# Patient Record
Sex: MALE | URBAN - METROPOLITAN AREA
[De-identification: names, ages, dates, MRNs, and addresses within clinical notes are randomized per-mention and may not be internally consistent; named-entity substitution may affect disease eponyms.]

---

## 2022-12-09 ENCOUNTER — APPOINTMENT (OUTPATIENT)
Dept: CT IMAGING | Age: 82
End: 2022-12-09
Payer: MEDICARE

## 2022-12-09 ENCOUNTER — HOSPITAL ENCOUNTER (EMERGENCY)
Age: 82
Discharge: HOME | End: 2022-12-09
Payer: MEDICARE

## 2022-12-09 VITALS
WEIGHT: 167 LBS | HEART RATE: 75 BPM | OXYGEN SATURATION: 98 % | BODY MASS INDEX: 22.62 KG/M2 | HEIGHT: 72 IN | RESPIRATION RATE: 18 BRPM | TEMPERATURE: 98.6 F | DIASTOLIC BLOOD PRESSURE: 78 MMHG | SYSTOLIC BLOOD PRESSURE: 145 MMHG

## 2022-12-09 DIAGNOSIS — S02.2XXA CLOSED FRACTURE OF NASAL BONE, INITIAL ENCOUNTER: ICD-10-CM

## 2022-12-09 DIAGNOSIS — S00.03XA HEMATOMA OF FRONTAL SCALP, INITIAL ENCOUNTER: Primary | ICD-10-CM

## 2022-12-09 LAB
ABO/RH: NORMAL
ALBUMIN SERPL-MCNC: 3.8 G/DL (ref 3.5–4.6)
ALP BLD-CCNC: 88 U/L (ref 35–104)
ALT SERPL-CCNC: 11 U/L (ref 0–41)
ANION GAP SERPL CALCULATED.3IONS-SCNC: 12 MEQ/L (ref 9–15)
ANTIBODY SCREEN: NORMAL
APTT: 28.7 SEC (ref 24.4–36.8)
AST SERPL-CCNC: 24 U/L (ref 0–40)
BASOPHILS ABSOLUTE: 0 K/UL (ref 0–0.2)
BASOPHILS RELATIVE PERCENT: 0.5 %
BILIRUB SERPL-MCNC: 0.5 MG/DL (ref 0.2–0.7)
BUN BLDV-MCNC: 34 MG/DL (ref 8–23)
CALCIUM SERPL-MCNC: 8.6 MG/DL (ref 8.5–9.9)
CHLORIDE BLD-SCNC: 102 MEQ/L (ref 95–107)
CO2: 21 MEQ/L (ref 20–31)
CREAT SERPL-MCNC: 1.02 MG/DL (ref 0.7–1.2)
EOSINOPHILS ABSOLUTE: 0.1 K/UL (ref 0–0.7)
EOSINOPHILS RELATIVE PERCENT: 1.8 %
GFR SERPL CREATININE-BSD FRML MDRD: >60 ML/MIN/{1.73_M2}
GLOBULIN: 2.8 G/DL (ref 2.3–3.5)
GLUCOSE BLD-MCNC: 85 MG/DL (ref 70–99)
HCT VFR BLD CALC: 43.8 % (ref 42–52)
HEMOGLOBIN: 14.6 G/DL (ref 14–18)
INR BLD: 1
LYMPHOCYTES ABSOLUTE: 1.4 K/UL (ref 1–4.8)
LYMPHOCYTES RELATIVE PERCENT: 20.5 %
MCH RBC QN AUTO: 34.3 PG (ref 27–31.3)
MCHC RBC AUTO-ENTMCNC: 33.3 % (ref 33–37)
MCV RBC AUTO: 102.9 FL (ref 79–92.2)
MONOCYTES ABSOLUTE: 0.5 K/UL (ref 0.2–0.8)
MONOCYTES RELATIVE PERCENT: 6.7 %
NEUTROPHILS ABSOLUTE: 4.9 K/UL (ref 1.4–6.5)
NEUTROPHILS RELATIVE PERCENT: 70.5 %
PDW BLD-RTO: 13.4 % (ref 11.5–14.5)
PLATELET # BLD: 181 K/UL (ref 130–400)
POTASSIUM SERPL-SCNC: 3.9 MEQ/L (ref 3.4–4.9)
PROTHROMBIN TIME: 13.3 SEC (ref 12.3–14.9)
RBC # BLD: 4.26 M/UL (ref 4.7–6.1)
SODIUM BLD-SCNC: 135 MEQ/L (ref 135–144)
TOTAL PROTEIN: 6.6 G/DL (ref 6.3–8)
WBC # BLD: 6.9 K/UL (ref 4.8–10.8)

## 2022-12-09 PROCEDURE — 85025 COMPLETE CBC W/AUTO DIFF WBC: CPT

## 2022-12-09 PROCEDURE — 86900 BLOOD TYPING SEROLOGIC ABO: CPT

## 2022-12-09 PROCEDURE — 70486 CT MAXILLOFACIAL W/O DYE: CPT

## 2022-12-09 PROCEDURE — 99285 EMERGENCY DEPT VISIT HI MDM: CPT

## 2022-12-09 PROCEDURE — 6830039000 HC L3 TRAUMA ALERT

## 2022-12-09 PROCEDURE — 36415 COLL VENOUS BLD VENIPUNCTURE: CPT

## 2022-12-09 PROCEDURE — 72125 CT NECK SPINE W/O DYE: CPT

## 2022-12-09 PROCEDURE — 80053 COMPREHEN METABOLIC PANEL: CPT

## 2022-12-09 PROCEDURE — 70450 CT HEAD/BRAIN W/O DYE: CPT

## 2022-12-09 PROCEDURE — 85730 THROMBOPLASTIN TIME PARTIAL: CPT

## 2022-12-09 PROCEDURE — 86850 RBC ANTIBODY SCREEN: CPT

## 2022-12-09 PROCEDURE — 86901 BLOOD TYPING SEROLOGIC RH(D): CPT

## 2022-12-09 PROCEDURE — 85610 PROTHROMBIN TIME: CPT

## 2022-12-09 ASSESSMENT — PAIN SCALES - GENERAL: PAINLEVEL_OUTOF10: 5

## 2022-12-09 ASSESSMENT — ENCOUNTER SYMPTOMS
PHOTOPHOBIA: 0
DIARRHEA: 0
EYE PAIN: 0
RHINORRHEA: 0
NAUSEA: 0
FACIAL SWELLING: 1
VOMITING: 0
COUGH: 0
ABDOMINAL PAIN: 0
BACK PAIN: 0
SORE THROAT: 0
SHORTNESS OF BREATH: 0

## 2022-12-09 ASSESSMENT — LIFESTYLE VARIABLES
HOW OFTEN DO YOU HAVE A DRINK CONTAINING ALCOHOL: NEVER
HOW MANY STANDARD DRINKS CONTAINING ALCOHOL DO YOU HAVE ON A TYPICAL DAY: PATIENT DOES NOT DRINK

## 2022-12-09 ASSESSMENT — PAIN - FUNCTIONAL ASSESSMENT
PAIN_FUNCTIONAL_ASSESSMENT: 0-10
PAIN_FUNCTIONAL_ASSESSMENT: NONE - DENIES PAIN

## 2022-12-09 ASSESSMENT — PAIN DESCRIPTION - LOCATION: LOCATION: HEAD

## 2022-12-09 ASSESSMENT — PAIN DESCRIPTION - DESCRIPTORS: DESCRIPTORS: ACHING

## 2022-12-09 ASSESSMENT — PAIN DESCRIPTION - ORIENTATION: ORIENTATION: LEFT

## 2022-12-09 ASSESSMENT — PAIN DESCRIPTION - ONSET: ONSET: SUDDEN

## 2022-12-09 ASSESSMENT — PAIN DESCRIPTION - FREQUENCY: FREQUENCY: CONTINUOUS

## 2022-12-09 NOTE — ED PROVIDER NOTES
[address] [facility] ED  eMERGENCY dEPARTMENTeNCOUnter      Pt Name: Cara Manzo  MRN: [id number]  Armsmariselagfhardy 1940  Date ofevaluation: 12/9/2022  Provider: Geoff Haynes PA-C    CHIEF COMPLAINT       Chief Complaint   Patient presents with    Fall         HISTORY OF PRESENT ILLNESS   (Location/Symptom, Timing/Onset,Context/Setting, Quality, Duration, Modifying Factors, Severity)  Note limiting factors. Cara Manzo is a 81 y.o. male who presents to the emergency department. Patient tripped while he was at [facility] waiting for his wife to get in with his EGD he does have neuropathy in his feet tripped on the carpet causing him to fall forward he did hit his face on the floor. He does not think he lost consciousness he only complains of pain to his left eyebrow which has obvious swelling. He is on blood thinners. He denies any vision changes nausea vomiting chest pain shortness of breath neck or back pain. HPI    NursingNotes were reviewed. REVIEW OF SYSTEMS    (2-9 systems for level 4, 10 or more for level 5)     Review of Systems   Constitutional:  Negative for chills, diaphoresis, fatigue and fever. HENT:  Positive for facial swelling. Negative for congestion, rhinorrhea and sore throat. Eyes:  Negative for photophobia and pain. Respiratory:  Negative for cough and shortness of breath. Cardiovascular:  Negative for chest pain and palpitations. Gastrointestinal:  Negative for abdominal pain, diarrhea, nausea and vomiting. Genitourinary:  Negative for dysuria and flank pain. Musculoskeletal:  Negative for back pain. Skin:  Negative for rash. Neurological:  Negative for dizziness, light-headedness and headaches. Psychiatric/Behavioral: Negative. All other systems reviewed and are negative. Except as noted above the remainder of the review of systems was reviewed and negative. PAST MEDICAL HISTORY   History reviewed.  No pertinent past medical history. SURGICALHISTORY     History reviewed. No pertinent surgical history. CURRENT MEDICATIONS     There are no discharge medications for this patient. ALLERGIES     Ace inhibitors, Adhesive tape, Codeine, Eggs or egg-derived products, Metoprolol, Pradaxa [dabigatran etexilate mesylate], and Scopolamine    FAMILY HISTORY     History reviewed. No pertinent family history. SOCIAL HISTORY       Social History     Socioeconomic History    Marital status:      Spouse name: None    Number of children: None    Years of education: None    Highest education level: None   Tobacco Use    Smoking status: Never     Passive exposure: Never    Smokeless tobacco: Never   Substance and Sexual Activity    Alcohol use: Never    Drug use: Never    Sexual activity: Never       SCREENINGS    [place] Coma Scale  Eye Opening: Spontaneous  Best Verbal Response: Oriented  Best Motor Response: Obeys commands  [place] Coma Scale Score: 15 @FLOW([id number])@      PHYSICAL EXAM    (up to 7 for level 4, 8 or more for level 5)     ED Triage Vitals [12/09/22 1103]   BP Temp Temp Source Heart Rate Resp SpO2 Height Weight   (!) 162/97 97.8 °F (36.6 °C) Oral 77 20 98 % 6' (1.829 m) 167 lb (75.8 kg)       Physical Exam  Vitals and nursing note reviewed. Constitutional:       General: He is not in acute distress. Appearance: Normal appearance. He is well-developed. He is not diaphoretic. HENT:      Head: Normocephalic and atraumatic. Nose: Nasal tenderness present. Mouth/Throat:      Mouth: Mucous membranes are moist.   Eyes:      General: Lids are normal.      Conjunctiva/sclera: Conjunctivae normal.      Pupils: Pupils are equal, round, and reactive to light. Visual Fields: Right eye visual fields normal and left eye visual fields normal.   Cardiovascular:      Rate and Rhythm: Normal rate and regular rhythm. Pulses: Normal pulses. Heart sounds: Normal heart sounds.    Pulmonary: Effort: Pulmonary effort is normal.      Breath sounds: Normal breath sounds. Abdominal:      General: Bowel sounds are normal.      Palpations: Abdomen is soft. Tenderness: There is no abdominal tenderness. Musculoskeletal:         General: Normal range of motion. Cervical back: Normal range of motion and neck supple. Lymphadenopathy:      Cervical: No cervical adenopathy. Skin:     General: Skin is warm and dry. Capillary Refill: Capillary refill takes less than 2 seconds. Findings: No rash. Neurological:      Mental Status: He is alert and oriented to person, place, and time. Comments: Pupils equal round reactive. Bilateral equal hand grasp bilateral push and pulls of upper lower extremities 5 out of 5 strength no facial droop no slurred speech voice is diminished due to vocal cord surgery in the past.   Psychiatric:         Thought Content: Thought content normal.         Judgment: Judgment normal.       DIAGNOSTIC RESULTS     EKG: All EKG's are interpreted by the Emergency Department Physician who either signs or Co-signsthis chart in the absence of a cardiologist.        RADIOLOGY:   Tra Grout such as CT, Ultrasound and MRI are read by the radiologist. Plain radiographic images are visualized and preliminarily interpreted by the emergency physician with the below findings:        Interpretation per the Radiologist below, if available at the time ofthis note:    CT HEAD WO CONTRAST   Final Result   No acute intracranial abnormality. Chronic atrophic brain changes and chronic microvascular disease. CT CERVICAL SPINE WO CONTRAST   Final Result   No fracture. Moderate degenerative change cervical spine. Bilateral carotid calcification. RECOMMENDATIONS:   If clinical concern warrants, carotid sonography may be obtained for further   evaluation.          CT FACIAL BONES WO CONTRAST   Final Result   Addendum (preliminary) 1 of 1   ADDENDUM: Left frontal scalp soft tissue hematoma. A lucency is visualized in the nasal septum the could represent a fracture   would recommend clinical correlation. Periapical lucency in the right maxillary medial incisor. Final   Left frontal scalp soft tissue hematoma. A lucency is visualized in the nasal septum the could represent a fracture   would recommend clinical correlation. Periapical lucency in the right maxillary medial incisor. ED BEDSIDE ULTRASOUND:   Performed by ED Physician - none    LABS:  Labs Reviewed   CBC WITH AUTO DIFFERENTIAL - Abnormal; Notable for the following components:       Result Value    RBC 4.26 (*)     .9 (*)     MCH 34.3 (*)     All other components within normal limits   COMPREHENSIVE METABOLIC PANEL - Abnormal; Notable for the following components:    BUN 34 (*)     All other components within normal limits   PROTIME-INR   APTT   TYPE AND SCREEN       All other labs were within normal range or not returned as of this dictation. EMERGENCY DEPARTMENT COURSE and DIFFERENTIAL DIAGNOSIS/MDM:   Vitals:    Vitals:    12/09/22 1130 12/09/22 1207 12/09/22 1230 12/09/22 1345   BP: (!) 154/93 (!) 176/101 (!) 160/81 (!) 145/78   Pulse: 71 73 69 75   Resp: 20 21 19 18   Temp:    98.6 °F (37 °C)   TempSrc:    Oral   SpO2: 97% 96%  98%   Weight:       Height:               MDM    She had an mechanical fall prior to arrival.  He is on blood thinners. Has a normal focal neurological exam.  He has localized swelling and bruising to his left eyebrow region. He remains neurologically intact on serial examinations. CT facial bones show small lucency to nasal bones which he does have point tenderness well treat as a nasal bone fracture. Ice following up with ENT. CT brain is negative for acute pathology at this time.   Patient's wife at bedside as well all questions were answered recommending ice Tylenol for pain and to return to the ED for any new worsening concerning symptoms. Patient wife verbalized understanding patient stable for discharge. REASSESSMENT          CRITICAL CARE TIME   Total Critical Care time was  minutes, excluding separatelyreportable procedures. There was a high probability ofclinically significant/life threatening deterioration in the patient's condition which required my urgent intervention. CONSULTS:  None    PROCEDURES:  Unless otherwise noted below, none     Procedures    FINAL IMPRESSION      1. Hematoma of frontal scalp, initial encounter    2. Closed fracture of nasal bone, initial encounter          DISPOSITION/PLAN   DISPOSITION Decision To Discharge 12/09/2022 01:18:39 PM      PATIENT REFERREDTO:  [address]  PRIMARY CARE  [address] [address]  [phone]  Schedule an appointment as soon as possible for a visit in 2 days        DISCHARGEMEDICATIONS:  There are no discharge medications for this patient.          (Please note that portions of this note were completed with a voice recognition program.  Efforts were made to edit the dictations but occasionally words are mis-transcribed.)    Janis Gupta PA-C (electronically signed)  Attending Emergency Physician         Janis Gupta PA-C  12/09/22 [id number]

## 2024-06-17 ENCOUNTER — APPOINTMENT (OUTPATIENT)
Dept: RADIOLOGY | Facility: HOSPITAL | Age: 84
DRG: 536 | End: 2024-06-17
Payer: MEDICARE

## 2024-06-17 ENCOUNTER — HOSPITAL ENCOUNTER (INPATIENT)
Facility: HOSPITAL | Age: 84
LOS: 2 days | Discharge: NURSING, REHAB OR LONG-TERM CARE | End: 2024-06-23
Attending: STUDENT IN AN ORGANIZED HEALTH CARE EDUCATION/TRAINING PROGRAM | Admitting: STUDENT IN AN ORGANIZED HEALTH CARE EDUCATION/TRAINING PROGRAM
Payer: MEDICARE

## 2024-06-17 ENCOUNTER — APPOINTMENT (OUTPATIENT)
Dept: CARDIOLOGY | Facility: HOSPITAL | Age: 84
DRG: 536 | End: 2024-06-17
Payer: MEDICARE

## 2024-06-17 DIAGNOSIS — K21.9 GASTROESOPHAGEAL REFLUX DISEASE WITHOUT ESOPHAGITIS: ICD-10-CM

## 2024-06-17 DIAGNOSIS — J38.3 VOCAL FOLD LEUKOPLAKIA: ICD-10-CM
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
## 2024-06-17 DIAGNOSIS — I10 ESSENTIAL HYPERTENSION: ICD-10-CM

## 2024-06-17 DIAGNOSIS — S32.591A CLOSED FRACTURE OF RAMUS OF RIGHT PUBIS, INITIAL ENCOUNTER (MULTI): ICD-10-CM

## 2024-06-17 PROBLEM — I12.9 BENIGN HYPERTENSION WITH CKD (CHRONIC KIDNEY DISEASE) STAGE III (MULTI): Status: ACTIVE | Noted: 2021-10-18

## 2024-06-17 PROBLEM — Z95.818 PRESENCE OF WATCHMAN LEFT ATRIAL APPENDAGE CLOSURE DEVICE: Chronic | Status: ACTIVE | Noted: 2020-10-29

## 2024-06-17 PROBLEM — R26.9 ABNORMAL GAIT: Status: ACTIVE | Noted: 2021-02-02

## 2024-06-17 PROBLEM — Z95.1 HISTORY OF CORONARY ARTERY BYPASS SURGERY: Chronic | Status: ACTIVE | Noted: 2021-04-12

## 2024-06-17 PROBLEM — I36.1 NONRHEUMATIC TRICUSPID VALVE REGURGITATION: Status: ACTIVE | Noted: 2023-07-19

## 2024-06-17 PROBLEM — G89.29 CHRONIC BILATERAL LOW BACK PAIN WITHOUT SCIATICA: Chronic | Status: ACTIVE | Noted: 2023-11-14

## 2024-06-17 PROBLEM — F33.0 MILD EPISODE OF RECURRENT MAJOR DEPRESSIVE DISORDER (CMS-HCC): Status: ACTIVE | Noted: 2021-02-02

## 2024-06-17 PROBLEM — R53.81 DEBILITY: Status: ACTIVE | Noted: 2019-11-24

## 2024-06-17 PROBLEM — N40.0 BENIGN PROSTATIC HYPERPLASIA WITHOUT URINARY OBSTRUCTION: Status: ACTIVE | Noted: 2022-12-16

## 2024-06-17 PROBLEM — N18.30 BENIGN HYPERTENSION WITH CKD (CHRONIC KIDNEY DISEASE) STAGE III (MULTI): Status: ACTIVE | Noted: 2021-10-18

## 2024-06-17 PROBLEM — R29.898 RIGHT LEG WEAKNESS: Status: ACTIVE | Noted: 2021-04-14

## 2024-06-17 PROBLEM — R62.7 FAILURE TO THRIVE IN ADULT: Status: ACTIVE | Noted: 2023-05-18

## 2024-06-17 PROBLEM — E78.5 HYPERLIPIDEMIA: Status: ACTIVE | Noted: 2017-06-21

## 2024-06-17 PROBLEM — M54.50 CHRONIC BILATERAL LOW BACK PAIN WITHOUT SCIATICA: Chronic | Status: ACTIVE | Noted: 2023-11-14

## 2024-06-17 PROBLEM — I50.32 CHRONIC DIASTOLIC CHF (CONGESTIVE HEART FAILURE) (MULTI): Status: ACTIVE | Noted: 2023-11-15

## 2024-06-17 LAB
ALBUMIN SERPL BCP-MCNC: 4 G/DL (ref 3.4–5)
ALP SERPL-CCNC: 115 U/L (ref 33–136)
ALT SERPL W P-5'-P-CCNC: 11 U/L (ref 10–52)
ANION GAP SERPL CALC-SCNC: 14 MMOL/L (ref 10–20)
AST SERPL W P-5'-P-CCNC: 19 U/L (ref 9–39)
BASOPHILS # BLD AUTO: 0.05 X10*3/UL (ref 0–0.1)
BASOPHILS NFR BLD AUTO: 0.7 %
BILIRUB SERPL-MCNC: 0.4 MG/DL (ref 0–1.2)
BUN SERPL-MCNC: 39 MG/DL (ref 6–23)
CALCIUM SERPL-MCNC: 9.2 MG/DL (ref 8.6–10.3)
CARDIAC TROPONIN I PNL SERPL HS: 6 NG/L (ref 0–20)
CARDIAC TROPONIN I PNL SERPL HS: 8 NG/L (ref 0–20)
CHLORIDE SERPL-SCNC: 106 MMOL/L (ref 98–107)
CO2 SERPL-SCNC: 24 MMOL/L (ref 21–32)
CREAT SERPL-MCNC: 1.3 MG/DL (ref 0.5–1.3)
EGFRCR SERPLBLD CKD-EPI 2021: 55 ML/MIN/1.73M*2
EOSINOPHIL # BLD AUTO: 0.12 X10*3/UL (ref 0–0.4)
EOSINOPHIL NFR BLD AUTO: 1.6 %
ERYTHROCYTE [DISTWIDTH] IN BLOOD BY AUTOMATED COUNT: 13.2 % (ref 11.5–14.5)
GLUCOSE SERPL-MCNC: 135 MG/DL (ref 74–99)
HCT VFR BLD AUTO: 40.2 % (ref 41–52)
HGB BLD-MCNC: 13.1 G/DL (ref 13.5–17.5)
IMM GRANULOCYTES # BLD AUTO: 0.06 X10*3/UL (ref 0–0.5)
IMM GRANULOCYTES NFR BLD AUTO: 0.8 % (ref 0–0.9)
LYMPHOCYTES # BLD AUTO: 1.87 X10*3/UL (ref 0.8–3)
LYMPHOCYTES NFR BLD AUTO: 24.9 %
MCH RBC QN AUTO: 32.4 PG (ref 26–34)
MCHC RBC AUTO-ENTMCNC: 32.6 G/DL (ref 32–36)
MCV RBC AUTO: 100 FL (ref 80–100)
MONOCYTES # BLD AUTO: 0.62 X10*3/UL (ref 0.05–0.8)
MONOCYTES NFR BLD AUTO: 8.2 %
NEUTROPHILS # BLD AUTO: 4.8 X10*3/UL (ref 1.6–5.5)
NEUTROPHILS NFR BLD AUTO: 63.8 %
NRBC BLD-RTO: 0 /100 WBCS (ref 0–0)
PLATELET # BLD AUTO: 234 X10*3/UL (ref 150–450)
POTASSIUM SERPL-SCNC: 4.5 MMOL/L (ref 3.5–5.3)
PROT SERPL-MCNC: 6.9 G/DL (ref 6.4–8.2)
RBC # BLD AUTO: 4.04 X10*6/UL (ref 4.5–5.9)
SODIUM SERPL-SCNC: 139 MMOL/L (ref 136–145)
WBC # BLD AUTO: 7.5 X10*3/UL (ref 4.4–11.3)

## 2024-06-17 PROCEDURE — G0378 HOSPITAL OBSERVATION PER HR: HCPCS

## 2024-06-17 PROCEDURE — 73700 CT LOWER EXTREMITY W/O DYE: CPT | Mod: RT

## 2024-06-17 PROCEDURE — 99285 EMERGENCY DEPT VISIT HI MDM: CPT

## 2024-06-17 PROCEDURE — 93005 ELECTROCARDIOGRAM TRACING: CPT

## 2024-06-17 PROCEDURE — 70450 CT HEAD/BRAIN W/O DYE: CPT

## 2024-06-17 PROCEDURE — 72192 CT PELVIS W/O DYE: CPT | Mod: FOREIGN READ | Performed by: RADIOLOGY

## 2024-06-17 PROCEDURE — 85025 COMPLETE CBC W/AUTO DIFF WBC: CPT | Performed by: STUDENT IN AN ORGANIZED HEALTH CARE EDUCATION/TRAINING PROGRAM

## 2024-06-17 PROCEDURE — 70450 CT HEAD/BRAIN W/O DYE: CPT | Performed by: RADIOLOGY

## 2024-06-17 PROCEDURE — 71045 X-RAY EXAM CHEST 1 VIEW: CPT | Mod: FOREIGN READ | Performed by: RADIOLOGY

## 2024-06-17 PROCEDURE — 84075 ASSAY ALKALINE PHOSPHATASE: CPT | Performed by: STUDENT IN AN ORGANIZED HEALTH CARE EDUCATION/TRAINING PROGRAM

## 2024-06-17 PROCEDURE — 99222 1ST HOSP IP/OBS MODERATE 55: CPT | Performed by: NURSE PRACTITIONER

## 2024-06-17 PROCEDURE — 72192 CT PELVIS W/O DYE: CPT

## 2024-06-17 PROCEDURE — 84484 ASSAY OF TROPONIN QUANT: CPT | Performed by: STUDENT IN AN ORGANIZED HEALTH CARE EDUCATION/TRAINING PROGRAM

## 2024-06-17 PROCEDURE — 84484 ASSAY OF TROPONIN QUANT: CPT | Mod: 91 | Performed by: STUDENT IN AN ORGANIZED HEALTH CARE EDUCATION/TRAINING PROGRAM

## 2024-06-17 PROCEDURE — 72125 CT NECK SPINE W/O DYE: CPT

## 2024-06-17 PROCEDURE — 36415 COLL VENOUS BLD VENIPUNCTURE: CPT | Performed by: STUDENT IN AN ORGANIZED HEALTH CARE EDUCATION/TRAINING PROGRAM

## 2024-06-17 PROCEDURE — 72125 CT NECK SPINE W/O DYE: CPT | Performed by: RADIOLOGY

## 2024-06-17 PROCEDURE — 71045 X-RAY EXAM CHEST 1 VIEW: CPT

## 2024-06-17 PROCEDURE — 73700 CT LOWER EXTREMITY W/O DYE: CPT | Mod: RIGHT SIDE | Performed by: RADIOLOGY

## 2024-06-17 ASSESSMENT — LIFESTYLE VARIABLES
EVER HAD A DRINK FIRST THING IN THE MORNING TO STEADY YOUR NERVES TO GET RID OF A HANGOVER: NO
TOTAL SCORE: 0
EVER FELT BAD OR GUILTY ABOUT YOUR DRINKING: NO
HAVE PEOPLE ANNOYED YOU BY CRITICIZING YOUR DRINKING: NO
HAVE YOU EVER FELT YOU SHOULD CUT DOWN ON YOUR DRINKING: NO

## 2024-06-17 ASSESSMENT — PAIN - FUNCTIONAL ASSESSMENT: PAIN_FUNCTIONAL_ASSESSMENT: 0-10

## 2024-06-17 ASSESSMENT — PAIN DESCRIPTION - PAIN TYPE: TYPE: ACUTE PAIN

## 2024-06-17 ASSESSMENT — COLUMBIA-SUICIDE SEVERITY RATING SCALE - C-SSRS
6. HAVE YOU EVER DONE ANYTHING, STARTED TO DO ANYTHING, OR PREPARED TO DO ANYTHING TO END YOUR LIFE?: NO
1. IN THE PAST MONTH, HAVE YOU WISHED YOU WERE DEAD OR WISHED YOU COULD GO TO SLEEP AND NOT WAKE UP?: NO
2. HAVE YOU ACTUALLY HAD ANY THOUGHTS OF KILLING YOURSELF?: NO

## 2024-06-17 ASSESSMENT — PAIN SCALES - GENERAL: PAINLEVEL_OUTOF10: 4

## 2024-06-18 ENCOUNTER — APPOINTMENT (OUTPATIENT)
Dept: RADIOLOGY | Facility: HOSPITAL | Age: 84
DRG: 536 | End: 2024-06-18
Payer: MEDICARE

## 2024-06-18 LAB
ALBUMIN SERPL BCP-MCNC: 3.6 G/DL (ref 3.4–5)
ALP SERPL-CCNC: 109 U/L (ref 33–136)
ALT SERPL W P-5'-P-CCNC: 11 U/L (ref 10–52)
ANION GAP SERPL CALC-SCNC: 12 MMOL/L (ref 10–20)
AST SERPL W P-5'-P-CCNC: 15 U/L (ref 9–39)
BASOPHILS # BLD AUTO: 0.06 X10*3/UL (ref 0–0.1)
BASOPHILS NFR BLD AUTO: 0.9 %
BILIRUB SERPL-MCNC: 0.4 MG/DL (ref 0–1.2)
BUN SERPL-MCNC: 35 MG/DL (ref 6–23)
CALCIUM SERPL-MCNC: 8.7 MG/DL (ref 8.6–10.3)
CHLORIDE SERPL-SCNC: 109 MMOL/L (ref 98–107)
CO2 SERPL-SCNC: 22 MMOL/L (ref 21–32)
CREAT SERPL-MCNC: 1.11 MG/DL (ref 0.5–1.3)
EGFRCR SERPLBLD CKD-EPI 2021: 66 ML/MIN/1.73M*2
EOSINOPHIL # BLD AUTO: 0.18 X10*3/UL (ref 0–0.4)
EOSINOPHIL NFR BLD AUTO: 2.8 %
ERYTHROCYTE [DISTWIDTH] IN BLOOD BY AUTOMATED COUNT: 13.1 % (ref 11.5–14.5)
GLUCOSE SERPL-MCNC: 92 MG/DL (ref 74–99)
HCT VFR BLD AUTO: 36.3 % (ref 41–52)
HGB BLD-MCNC: 11.9 G/DL (ref 13.5–17.5)
HOLD SPECIMEN: NORMAL
IMM GRANULOCYTES # BLD AUTO: 0.02 X10*3/UL (ref 0–0.5)
IMM GRANULOCYTES NFR BLD AUTO: 0.3 % (ref 0–0.9)
LYMPHOCYTES # BLD AUTO: 1.21 X10*3/UL (ref 0.8–3)
LYMPHOCYTES NFR BLD AUTO: 18.8 %
MAGNESIUM SERPL-MCNC: 1.91 MG/DL (ref 1.6–2.4)
MCH RBC QN AUTO: 32.8 PG (ref 26–34)
MCHC RBC AUTO-ENTMCNC: 32.8 G/DL (ref 32–36)
MCV RBC AUTO: 100 FL (ref 80–100)
MONOCYTES # BLD AUTO: 0.67 X10*3/UL (ref 0.05–0.8)
MONOCYTES NFR BLD AUTO: 10.4 %
NEUTROPHILS # BLD AUTO: 4.29 X10*3/UL (ref 1.6–5.5)
NEUTROPHILS NFR BLD AUTO: 66.8 %
NRBC BLD-RTO: 0 /100 WBCS (ref 0–0)
PLATELET # BLD AUTO: 181 X10*3/UL (ref 150–450)
POTASSIUM SERPL-SCNC: 3.8 MMOL/L (ref 3.5–5.3)
PROT SERPL-MCNC: 6.3 G/DL (ref 6.4–8.2)
RBC # BLD AUTO: 3.63 X10*6/UL (ref 4.5–5.9)
SODIUM SERPL-SCNC: 139 MMOL/L (ref 136–145)
WBC # BLD AUTO: 6.4 X10*3/UL (ref 4.4–11.3)

## 2024-06-18 PROCEDURE — 2500000001 HC RX 250 WO HCPCS SELF ADMINISTERED DRUGS (ALT 637 FOR MEDICARE OP): Performed by: NURSE PRACTITIONER

## 2024-06-18 PROCEDURE — 2500000004 HC RX 250 GENERAL PHARMACY W/ HCPCS (ALT 636 FOR OP/ED)

## 2024-06-18 PROCEDURE — 97165 OT EVAL LOW COMPLEX 30 MIN: CPT | Mod: GO

## 2024-06-18 PROCEDURE — 99221 1ST HOSP IP/OBS SF/LOW 40: CPT | Performed by: STUDENT IN AN ORGANIZED HEALTH CARE EDUCATION/TRAINING PROGRAM

## 2024-06-18 PROCEDURE — 72170 X-RAY EXAM OF PELVIS: CPT

## 2024-06-18 PROCEDURE — 97161 PT EVAL LOW COMPLEX 20 MIN: CPT | Mod: GP | Performed by: PHYSICAL THERAPIST

## 2024-06-18 PROCEDURE — 72170 X-RAY EXAM OF PELVIS: CPT | Performed by: RADIOLOGY

## 2024-06-18 PROCEDURE — 2500000002 HC RX 250 W HCPCS SELF ADMINISTERED DRUGS (ALT 637 FOR MEDICARE OP, ALT 636 FOR OP/ED)

## 2024-06-18 PROCEDURE — 80053 COMPREHEN METABOLIC PANEL: CPT | Performed by: NURSE PRACTITIONER

## 2024-06-18 PROCEDURE — 36415 COLL VENOUS BLD VENIPUNCTURE: CPT | Performed by: NURSE PRACTITIONER

## 2024-06-18 PROCEDURE — 83735 ASSAY OF MAGNESIUM: CPT | Performed by: NURSE PRACTITIONER

## 2024-06-18 PROCEDURE — 73552 X-RAY EXAM OF FEMUR 2/>: CPT | Mod: RT

## 2024-06-18 PROCEDURE — 2500000001 HC RX 250 WO HCPCS SELF ADMINISTERED DRUGS (ALT 637 FOR MEDICARE OP)

## 2024-06-18 PROCEDURE — 85025 COMPLETE CBC W/AUTO DIFF WBC: CPT | Performed by: NURSE PRACTITIONER

## 2024-06-18 PROCEDURE — G0378 HOSPITAL OBSERVATION PER HR: HCPCS

## 2024-06-18 PROCEDURE — 2500000004 HC RX 250 GENERAL PHARMACY W/ HCPCS (ALT 636 FOR OP/ED): Performed by: NURSE PRACTITIONER

## 2024-06-18 PROCEDURE — C9113 INJ PANTOPRAZOLE SODIUM, VIA: HCPCS | Performed by: NURSE PRACTITIONER

## 2024-06-18 PROCEDURE — 73552 X-RAY EXAM OF FEMUR 2/>: CPT | Performed by: RADIOLOGY

## 2024-06-18 RX ORDER — SIMVASTATIN 20 MG/1
20 TABLET, FILM COATED ORAL NIGHTLY
Status: DISCONTINUED | OUTPATIENT
Start: 2024-06-18 | End: 2024-06-23 | Stop reason: HOSPADM

## 2024-06-18 RX ORDER — ACETAMINOPHEN 325 MG/1
650 TABLET ORAL EVERY 4 HOURS PRN
Status: DISCONTINUED | OUTPATIENT
Start: 2024-06-18 | End: 2024-06-23 | Stop reason: HOSPADM

## 2024-06-18 RX ORDER — CYANOCOBALAMIN (VITAMIN B-12) 500 MCG
3 TABLET ORAL NIGHTLY PRN
COMMUNITY

## 2024-06-18 RX ORDER — ASPIRIN 81 MG/1
81 TABLET ORAL DAILY
Status: DISCONTINUED | OUTPATIENT
Start: 2024-06-18 | End: 2024-06-23 | Stop reason: HOSPADM

## 2024-06-18 RX ORDER — PANTOPRAZOLE SODIUM 40 MG/10ML
40 INJECTION, POWDER, LYOPHILIZED, FOR SOLUTION INTRAVENOUS DAILY
Status: DISCONTINUED | OUTPATIENT
Start: 2024-06-18 | End: 2024-06-23 | Stop reason: HOSPADM

## 2024-06-18 RX ORDER — LISINOPRIL 5 MG/1
5 TABLET ORAL DAILY
Status: DISCONTINUED | OUTPATIENT
Start: 2024-06-18 | End: 2024-06-18

## 2024-06-18 RX ORDER — ASPIRIN 81 MG/1
81 TABLET ORAL DAILY
COMMUNITY

## 2024-06-18 RX ORDER — SIMVASTATIN 20 MG/1
20 TABLET, FILM COATED ORAL NIGHTLY
COMMUNITY

## 2024-06-18 RX ORDER — POLYETHYLENE GLYCOL 3350 17 G/17G
17 POWDER, FOR SOLUTION ORAL NIGHTLY
COMMUNITY

## 2024-06-18 RX ORDER — OMEPRAZOLE 40 MG/1
40 CAPSULE, DELAYED RELEASE ORAL
COMMUNITY

## 2024-06-18 RX ORDER — FUROSEMIDE 20 MG/1
10 TABLET ORAL DAILY
COMMUNITY

## 2024-06-18 RX ORDER — AMLODIPINE BESYLATE 5 MG/1
5 TABLET ORAL DAILY
Status: DISCONTINUED | OUTPATIENT
Start: 2024-06-18 | End: 2024-06-19

## 2024-06-18 RX ORDER — ONDANSETRON 4 MG/1
4 TABLET, FILM COATED ORAL EVERY 8 HOURS PRN
Status: DISCONTINUED | OUTPATIENT
Start: 2024-06-18 | End: 2024-06-23 | Stop reason: HOSPADM

## 2024-06-18 RX ORDER — ACETAMINOPHEN 650 MG/1
650 SUPPOSITORY RECTAL EVERY 4 HOURS PRN
Status: DISCONTINUED | OUTPATIENT
Start: 2024-06-18 | End: 2024-06-23 | Stop reason: HOSPADM

## 2024-06-18 RX ORDER — PANTOPRAZOLE SODIUM 40 MG/1
40 TABLET, DELAYED RELEASE ORAL DAILY
Status: DISCONTINUED | OUTPATIENT
Start: 2024-06-18 | End: 2024-06-23 | Stop reason: HOSPADM

## 2024-06-18 RX ORDER — POLYETHYLENE GLYCOL 3350 17 G/17G
17 POWDER, FOR SOLUTION ORAL NIGHTLY
Status: DISCONTINUED | OUTPATIENT
Start: 2024-06-18 | End: 2024-06-23 | Stop reason: HOSPADM

## 2024-06-18 RX ORDER — DOCUSATE SODIUM 100 MG/1
100 CAPSULE, LIQUID FILLED ORAL 2 TIMES DAILY
Status: DISCONTINUED | OUTPATIENT
Start: 2024-06-18 | End: 2024-06-18

## 2024-06-18 RX ORDER — DOCUSATE SODIUM 50 MG/5ML
50 LIQUID ORAL 2 TIMES DAILY
Status: DISCONTINUED | OUTPATIENT
Start: 2024-06-18 | End: 2024-06-19

## 2024-06-18 RX ORDER — ACETAMINOPHEN 160 MG/5ML
650 SOLUTION ORAL EVERY 4 HOURS PRN
Status: DISCONTINUED | OUTPATIENT
Start: 2024-06-18 | End: 2024-06-23 | Stop reason: HOSPADM

## 2024-06-18 RX ORDER — DEXTROMETHORPHAN HYDROBROMIDE, GUAIFENESIN 5; 100 MG/5ML; MG/5ML
650 LIQUID ORAL EVERY 8 HOURS PRN
COMMUNITY

## 2024-06-18 RX ORDER — TALC
3 POWDER (GRAM) TOPICAL NIGHTLY PRN
Status: DISCONTINUED | OUTPATIENT
Start: 2024-06-18 | End: 2024-06-23 | Stop reason: HOSPADM

## 2024-06-18 RX ORDER — FELODIPINE 5 MG/1
5 TABLET, EXTENDED RELEASE ORAL DAILY
Status: DISCONTINUED | OUTPATIENT
Start: 2024-06-18 | End: 2024-06-18

## 2024-06-18 RX ORDER — SENNOSIDES 8.6 MG/1
1 TABLET ORAL DAILY
Status: ON HOLD | COMMUNITY
End: 2024-06-18 | Stop reason: WASHOUT

## 2024-06-18 RX ORDER — DOXYCYCLINE 100 MG/1
100 TABLET ORAL 2 TIMES DAILY
COMMUNITY
End: 2024-06-23 | Stop reason: HOSPADM

## 2024-06-18 RX ORDER — FUROSEMIDE 40 MG/1
10 TABLET ORAL DAILY
Status: DISCONTINUED | OUTPATIENT
Start: 2024-06-18 | End: 2024-06-23 | Stop reason: HOSPADM

## 2024-06-18 RX ORDER — ONDANSETRON HYDROCHLORIDE 2 MG/ML
4 INJECTION, SOLUTION INTRAVENOUS EVERY 8 HOURS PRN
Status: DISCONTINUED | OUTPATIENT
Start: 2024-06-18 | End: 2024-06-23 | Stop reason: HOSPADM

## 2024-06-18 RX ORDER — DICLOFENAC SODIUM 10 MG/G
4 GEL TOPICAL 4 TIMES DAILY PRN
Status: DISCONTINUED | OUTPATIENT
Start: 2024-06-18 | End: 2024-06-23 | Stop reason: HOSPADM

## 2024-06-18 RX ORDER — SENNOSIDES 8.6 MG/1
1 TABLET ORAL DAILY
COMMUNITY

## 2024-06-18 RX ORDER — FELODIPINE 5 MG/1
5 TABLET, EXTENDED RELEASE ORAL DAILY
COMMUNITY
End: 2024-06-23 | Stop reason: HOSPADM

## 2024-06-18 RX ORDER — SENNOSIDES 8.6 MG/1
1 TABLET ORAL DAILY
Status: DISCONTINUED | OUTPATIENT
Start: 2024-06-18 | End: 2024-06-23 | Stop reason: HOSPADM

## 2024-06-18 RX ADMIN — ACETAMINOPHEN 650 MG: 325 TABLET ORAL at 02:05

## 2024-06-18 RX ADMIN — ASPIRIN 81 MG: 81 TABLET, COATED ORAL at 13:28

## 2024-06-18 RX ADMIN — ACETAMINOPHEN 650 MG: 325 TABLET ORAL at 20:29

## 2024-06-18 RX ADMIN — FUROSEMIDE 10 MG: 40 TABLET ORAL at 13:28

## 2024-06-18 RX ADMIN — PANTOPRAZOLE SODIUM 40 MG: 40 INJECTION, POWDER, FOR SOLUTION INTRAVENOUS at 05:56

## 2024-06-18 RX ADMIN — SIMVASTATIN 20 MG: 20 TABLET, FILM COATED ORAL at 20:29

## 2024-06-18 RX ADMIN — AMLODIPINE BESYLATE 5 MG: 5 TABLET ORAL at 16:53

## 2024-06-18 RX ADMIN — POLYETHYLENE GLYCOL 3350 17 G: 17 POWDER, FOR SOLUTION ORAL at 20:29

## 2024-06-18 RX ADMIN — DOCUSATE SODIUM 100 MG: 100 CAPSULE, LIQUID FILLED ORAL at 13:28

## 2024-06-18 RX ADMIN — DOCUSATE SODIUM 100 MG: 100 CAPSULE, LIQUID FILLED ORAL at 02:05

## 2024-06-18 RX ADMIN — DOCUSATE SODIUM LIQUID 50 MG: 100 LIQUID ORAL at 20:29

## 2024-06-18 RX ADMIN — STANDARDIZED SENNA CONCENTRATE 8.6 MG: 8.6 TABLET ORAL at 16:53

## 2024-06-18 SDOH — SOCIAL STABILITY: SOCIAL INSECURITY: DO YOU FEEL ANYONE HAS EXPLOITED OR TAKEN ADVANTAGE OF YOU FINANCIALLY OR OF YOUR PERSONAL PROPERTY?: NO

## 2024-06-18 SDOH — SOCIAL STABILITY: SOCIAL INSECURITY: HAVE YOU HAD ANY THOUGHTS OF HARMING ANYONE ELSE?: NO

## 2024-06-18 SDOH — SOCIAL STABILITY: SOCIAL INSECURITY: DOES ANYONE TRY TO KEEP YOU FROM HAVING/CONTACTING OTHER FRIENDS OR DOING THINGS OUTSIDE YOUR HOME?: NO

## 2024-06-18 SDOH — SOCIAL STABILITY: SOCIAL INSECURITY: ARE THERE ANY APPARENT SIGNS OF INJURIES/BEHAVIORS THAT COULD BE RELATED TO ABUSE/NEGLECT?: NO

## 2024-06-18 SDOH — SOCIAL STABILITY: SOCIAL INSECURITY: ARE YOU OR HAVE YOU BEEN THREATENED OR ABUSED PHYSICALLY, EMOTIONALLY, OR SEXUALLY BY ANYONE?: NO

## 2024-06-18 SDOH — SOCIAL STABILITY: SOCIAL INSECURITY: ABUSE: ADULT

## 2024-06-18 SDOH — SOCIAL STABILITY: SOCIAL INSECURITY: HAVE YOU HAD THOUGHTS OF HARMING ANYONE ELSE?: NO

## 2024-06-18 SDOH — SOCIAL STABILITY: SOCIAL INSECURITY: DO YOU FEEL UNSAFE GOING BACK TO THE PLACE WHERE YOU ARE LIVING?: NO

## 2024-06-18 SDOH — SOCIAL STABILITY: SOCIAL INSECURITY: HAS ANYONE EVER THREATENED TO HURT YOUR FAMILY OR YOUR PETS?: NO

## 2024-06-18 ASSESSMENT — COGNITIVE AND FUNCTIONAL STATUS - GENERAL
DRESSING REGULAR LOWER BODY CLOTHING: A LITTLE
MOBILITY SCORE: 14
MOVING TO AND FROM BED TO CHAIR: A LOT
TURNING FROM BACK TO SIDE WHILE IN FLAT BAD: A LITTLE
DRESSING REGULAR UPPER BODY CLOTHING: A LITTLE
CLIMB 3 TO 5 STEPS WITH RAILING: TOTAL
WALKING IN HOSPITAL ROOM: A LOT
DAILY ACTIVITIY SCORE: 18
TURNING FROM BACK TO SIDE WHILE IN FLAT BAD: A LITTLE
MOVING TO AND FROM BED TO CHAIR: A LOT
MOBILITY SCORE: 20
PATIENT BASELINE BEDBOUND: NO
HELP NEEDED FOR BATHING: A LITTLE
MOBILITY SCORE: 14
STANDING UP FROM CHAIR USING ARMS: A LITTLE
DAILY ACTIVITIY SCORE: 24
STANDING UP FROM CHAIR USING ARMS: A LITTLE
MOVING FROM LYING ON BACK TO SITTING ON SIDE OF FLAT BED WITH BEDRAILS: A LITTLE
WALKING IN HOSPITAL ROOM: A LOT
STANDING UP FROM CHAIR USING ARMS: A LITTLE
CLIMB 3 TO 5 STEPS WITH RAILING: TOTAL
PERSONAL GROOMING: A LITTLE
MOVING TO AND FROM BED TO CHAIR: A LITTLE
WALKING IN HOSPITAL ROOM: A LITTLE
CLIMB 3 TO 5 STEPS WITH RAILING: A LITTLE
MOVING FROM LYING ON BACK TO SITTING ON SIDE OF FLAT BED WITH BEDRAILS: A LITTLE
TOILETING: A LOT

## 2024-06-18 ASSESSMENT — ACTIVITIES OF DAILY LIVING (ADL)
WALKS IN HOME: NEEDS ASSISTANCE
BATHING_ASSISTANCE: MODERATE
LACK_OF_TRANSPORTATION: NO
GROOMING: INDEPENDENT
ADEQUATE_TO_COMPLETE_ADL: YES
PATIENT'S MEMORY ADEQUATE TO SAFELY COMPLETE DAILY ACTIVITIES?: YES
BATHING: INDEPENDENT
WALKS IN HOME: NEEDS ASSISTANCE
ASSISTIVE_DEVICE: EYEGLASSES;WALKER;CANE
HEARING - RIGHT EAR: FUNCTIONAL
TOILETING: INDEPENDENT
HEARING - LEFT EAR: FUNCTIONAL
HEARING - RIGHT EAR: FUNCTIONAL
TOILETING: INDEPENDENT
JUDGMENT_ADEQUATE_SAFELY_COMPLETE_DAILY_ACTIVITIES: YES
FEEDING YOURSELF: INDEPENDENT
GROOMING: INDEPENDENT
FEEDING YOURSELF: INDEPENDENT
ASSISTIVE_DEVICE: CANE;WALKER;OTHER (COMMENT)
BATHING: INDEPENDENT
ADEQUATE_TO_COMPLETE_ADL: YES
JUDGMENT_ADEQUATE_SAFELY_COMPLETE_DAILY_ACTIVITIES: YES
DRESSING YOURSELF: INDEPENDENT
PATIENT'S MEMORY ADEQUATE TO SAFELY COMPLETE DAILY ACTIVITIES?: YES
DRESSING YOURSELF: INDEPENDENT

## 2024-06-18 ASSESSMENT — LIFESTYLE VARIABLES
AUDIT-C TOTAL SCORE: 0
SKIP TO QUESTIONS 9-10: 1
HOW OFTEN DO YOU HAVE 6 OR MORE DRINKS ON ONE OCCASION: NEVER
HOW MANY STANDARD DRINKS CONTAINING ALCOHOL DO YOU HAVE ON A TYPICAL DAY: PATIENT DOES NOT DRINK
HOW OFTEN DO YOU HAVE A DRINK CONTAINING ALCOHOL: NEVER
AUDIT-C TOTAL SCORE: 0

## 2024-06-18 ASSESSMENT — PAIN SCALES - PAIN ASSESSMENT IN ADVANCED DEMENTIA (PAINAD): TOTALSCORE: MEDICATION (SEE MAR)

## 2024-06-18 ASSESSMENT — PAIN - FUNCTIONAL ASSESSMENT
PAIN_FUNCTIONAL_ASSESSMENT: 0-10

## 2024-06-18 ASSESSMENT — PAIN DESCRIPTION - LOCATION: LOCATION: HIP

## 2024-06-18 ASSESSMENT — PAIN SCALES - GENERAL
PAINLEVEL_OUTOF10: 0 - NO PAIN
PAINLEVEL_OUTOF10: 3
PAINLEVEL_OUTOF10: 0 - NO PAIN
PAINLEVEL_OUTOF10: 3
PAINLEVEL_OUTOF10: 3

## 2024-06-18 ASSESSMENT — PATIENT HEALTH QUESTIONNAIRE - PHQ9
SUM OF ALL RESPONSES TO PHQ9 QUESTIONS 1 & 2: 1
2. FEELING DOWN, DEPRESSED OR HOPELESS: NOT AT ALL
1. LITTLE INTEREST OR PLEASURE IN DOING THINGS: SEVERAL DAYS

## 2024-06-18 ASSESSMENT — PAIN DESCRIPTION - ORIENTATION: ORIENTATION: RIGHT

## 2024-06-18 NOTE — PROGRESS NOTES
06/18/24 1452   Discharge Planning   Home or Post Acute Services Post acute facilities (Rehab/SNF/etc)   Type of Post Acute Facility Services Skilled nursing   Patient expects to be discharged to: SNF   Does the patient need discharge transport arranged? Yes   RoundTrip coordination needed? Yes   Patient Choice   Provider Choice list and CMS website (https://medicare.gov/care-compare#search) for post-acute Quality and Resource Measure Data were provided and reviewed with: Family;Patient   Patient / Family choosing to utilize agency / facility established prior to hospitalization No     Pt currently 2 person assist. Pt and spouse agreeable to SNF at [place]. Choice list provided. Preferences are 1 [facility]. [facility] [place]. Will need precert. Shannon MENDEZ updated.

## 2024-06-18 NOTE — ED PROVIDER NOTES
HPI   Chief Complaint   Patient presents with    Fall     Patient fell backyards in the kitchen. No LOC or thinners. No nausea or vomiting.        Patient is an 84-year-old male with history of atrial fibrillation s/p watchman, hip arthroplasty who presents for a fall.  Patient states he was using his exercise machine when he went to walk to the kitchen.  States he tripped and fell.  No LOC, chest pain, shortness of breath.  States he remembers all of the details of the fall.  No anticoagulation use.  Endorses pain on the inside of his right thigh.  No pain in the hips.  No pain in his neck or back.  Patient's wife reportedly thought he had a change in speech during or shortly after the fall.  Patient states that he was scared and that is why his speech was different.  States he is speaking like he normally does.  Denies any history of stroke.                          [place] Coma Scale Score: 15                     Patient History   No past medical history on file.  No past surgical history on file.  No family history on file.  Social History     Tobacco Use    Smoking status: Not on file    Smokeless tobacco: Not on file   Substance Use Topics    Alcohol use: Not on file    Drug use: Not on file       Physical Exam   ED Triage Vitals [06/17/24 2054]   Temperature Heart Rate Respirations BP   36.2 °C (97.2 °F) 69 20 158/85      Pulse Ox Temp Source Heart Rate Source Patient Position   99 % Temporal Monitor Sitting      BP Location FiO2 (%)     Right arm --       Physical Exam  Constitutional:       General: He is not in acute distress.  HENT:      Head: Normocephalic.   Eyes:      Extraocular Movements: Extraocular movements intact.      Conjunctiva/sclera: Conjunctivae normal.      Pupils: Pupils are equal, round, and reactive to light.   Cardiovascular:      Rate and Rhythm: Normal rate and regular rhythm.      Pulses: Normal pulses.      Heart sounds: Normal heart sounds.   Pulmonary:      Effort: Pulmonary  effort is normal.      Breath sounds: Normal breath sounds.   Abdominal:      General: There is no distension.      Palpations: Abdomen is soft. There is no mass.      Tenderness: There is no abdominal tenderness. There is no guarding.   Musculoskeletal:         General: No deformity.      Cervical back: Normal range of motion and neck supple.      Right lower leg: No edema.      Left lower leg: No edema.      Comments: Mild tenderness palpation of the right medial thigh, no crepitus or deformity.  No tenderness palpation over the hips.  No tenderness palpation over the right knee.  C/T/L-spine nontender without any step-offs or deformities.   Skin:     General: Skin is warm and dry.      Findings: No lesion or rash.   Neurological:      General: No focal deficit present.      Mental Status: He is alert and oriented to person, place, and time. Mental status is at baseline.      Cranial Nerves: No cranial nerve deficit.      Sensory: No sensory deficit.      Motor: No weakness.      Comments: NIH 0, Van negative   Psychiatric:         Mood and Affect: Mood normal.         ED Course & MDM   Diagnoses as of 06/17/24 2325   Fall, initial encounter   Closed fracture of ramus of right pubis, initial encounter (Multi)       Medical Decision Making  EKG my interpretation: Rate 71, rhythm regular, axis leftward, CO unavailable, QRS at 96, QTc 456, T waves: Unremarkable, ST segments: No elevations or depressions, dictation: A-fib, no STEMI    EKG my interpretation: Rate 60, rhythm irregular, axis leftward, CO unavailable, QRS 98, QTc 432, T waves: Biphasic aVL, ST segments: No elevations or depressions, interpretation: Atrial fibrillation, no STEMI    Patient is a 84-year-old male with above-stated past medical history presents for a fall.  Patient's NIH is 0, he is Van negative, he states that he is speaking normally and he is alert and oriented, low suspicion for a stroke. Patient's EKG shows atrial fibrillation, though he  has had a Watchman procedure and his troponins are negative, low suspicion for ACS.  I do not believe he requires anticoagulation at this time.  Low suspicion for pulmonary embolism, dissection, Boerhaave's, pericardial effusion as cause of the patient's symptoms.  Patient's laboratory results show a mild anemia, this is improved versus previous value in the EMR.  Low suspicion for intrathoracic or intra-abdominal bleed at this time.  Patient is hemodynamically stable.  CT scans were negative for signs of acute fracture or dislocation of the C-spine, CTA was negative for acute findings.  CT scan of the pelvis and femur did show a inferior pubic rami fracture on the right.  Low suspicion for fracture in the mid femur as he has no pain in this location.  Patient is neurovascularly intact.  EMS reports that the patient falls frequently at home prior to this evening.  I am concerned about his safety should be discharged home as he lives with his elderly wife.  He was not able to get up after his fall this evening.  I discussed patient's case with the medicine service who accepted the patient for admission for PT OT evaluation and possible placement.    Disclaimer: This note was dictated using speech recognition software. Minor errors in transcription may be present. Please call if questions.     Isidro Zuleta MD  [facility] Emergency Medicine  Contact on [employer]        Problems Addressed:  Closed fracture of ramus of right pubis, initial encounter (Multi): acute illness or injury  Fall, initial encounter: acute illness or injury    Amount and/or Complexity of Data Reviewed  Labs: ordered.  Radiology: ordered.  ECG/medicine tests: ordered and independent interpretation performed.        Procedure  Procedures     Isidro Zuleta MD  06/17/24 [id number]

## 2024-06-18 NOTE — PROGRESS NOTES
XR right femur and hip independently reviewed and negative for fracture or dislocation.     No operative management for right inferior pubic rami fracture.     PT/OT: Okay for weight-bearing as tolerated to bilateral lower extremities    Follow up in outpatient orthopedic clinic in 3-4 weeks. Will have repeat XR at that time.    Orthopedics to sign off. Please do not hesitate to reach out with any additional questions or concerns.

## 2024-06-18 NOTE — PROGRESS NOTES
Physical Therapy    Physical Therapy Evaluation    Patient Name: Yared Aquino  MRN: [id number]  Today's Date: 6/18/2024   Time Calculation  Start Time: 1027  Stop Time: 1050  Time Calculation (min): 23 min  1106/1106-A    Assessment/Plan   PT Assessment  PT Assessment Results: Decreased strength, Decreased endurance, Impaired balance, Decreased mobility, Pain  Rehab Prognosis: Good  Evaluation/Treatment Tolerance: Patient limited by fatigue  Medical Staff Made Aware: Yes  Strengths: Support of Caregivers, Living arrangement secure, Housing layout, Ability to acquire knowledge, Access to adaptive/assistive products, Attitude of self  Barriers to Participation: Comorbidities  End of Session Communication: Bedside nurse  End of Session Patient Position: Up in chair, Alarm on (Call light within reach)  IP OR SWING BED PT PLAN  Inpatient or Swing Bed: Inpatient  PT Plan  Treatment/Interventions: Bed mobility, Transfer training, Gait training, Balance training, Strengthening, Endurance training, Therapeutic exercise  PT Plan: Ongoing PT  PT Frequency: 4 times per week  PT Discharge Recommendations: Moderate intensity level of continued care  PT Recommended Transfer Status: Assist x2, Assistive device  Physical Therapy eval completed per MD requisition. P.T. recommendations as outlined above. Recommend D/C from acute care when medically appropriate as deemed by medical staff.    Subjective     General Visit Information:  General  Reason for Referral: impaired mobility  Referred By: AYALA Graf CNP (PT/OT 6/18)  Past Medical History Relevant to Rehab: includes: HLD, MI, neuropahty, CHF, A-fb, CKD, GERD, B JACKELYN with ORIF R femur, Watchman's, falls, LBP, laryngeal CA, sciatica, tricuspid valve regurgitation, CABG, Kashia, MDD  Family/Caregiver Present: Yes  Caregiver Feedback: Wife came at end of session and was supportive  Prior to Session Communication: Bedside nurse  Patient Position Received: Bed, 3 rail up, Alarm  on  Preferred Learning Style: auditory, verbal  General Comment: Pt. is an 83yo who [facility] following a fall. Pt. c/o pain in R thigh. NIH=0. In ED /96.   Pelvic CT (6/17) (+) acute R inferior pubic ramus fracture   R hip CT (6/17) (+) Prior R JACKELYN with probable  revision of the hardware with a long intramedullary abelardo surrounded by  multiple wires. There is a subtle linear lucency in the cortex of the R mid  femur, along the anterior and medial aspect. Radiologist suspect this is a prior  diaphyseal fracture post surgical fixation but without prior for  comparison it is difficult to exclude an acute fracture.    R hip XR (6/18) Intact right hip arthroplasty with no identifiable acute adjacent  fracture. Old fracture deformity of the mid femoral diaphysis however.    Head CT (6/17) (-) acute findings    C-spine CT (6/17) (-) acute findings    CXR (6/17) (-) acute findings    Dx: Pelvic fracture, fall    Home Living:  Home Living  Home Living Comments: Pt. lived with spouse in a 1 level house with 2 FADI with B HR. Bed/bath on 1st floor with walkin shower with a seat and grab bars. Laundry on 1st floor.    Prior Level of Function:  Prior Function Per Pt/Caregiver Report  Prior Function Comments: Pt. amb with FWW PTA. Pt was I with ADLs PTA. Pt. and wife share light meal prep and laundry.  Pt has a cleaning service. Pt. reprted 3 falls in last 3 months. Pt. does not drive.    Precautions:  Precautions  Medical Precautions:  (Activity order: OOB with A)  Precautions Comment: Per EMR: High fall risk         Objective     Pain:  Pain Assessment  Pain Assessment: 0-10  Pain Score: 3 (with movement, no pain at rest)  Pain Type: Acute pain  Pain Location: Groin  Pain Orientation: Right  Pain Interventions: Repositioned    Cognition:  Cognition  Overall Cognitive Status: Within Functional Limits  Processing Speed: Delayed    General Assessments:  General Observation  General Observation: Tele    Activity Tolerance  Endurance: Decreased tolerance for upright activites  Sensation  Sensation Comment: Pt. reports neuropathy woth occasional R heel pain 8/10              Dynamic Sitting Balance  Dynamic Sitting-Comments: Good static and dynamic sitting balance  Dynamic Standing Balance  Dynamic Standing-Comments: Fair static and dynamic standing balance    Functional Assessments:     Bed Mobility  Bed Mobility: Yes  Bed Mobility 1  Bed Mobility 1: Supine to sitting  Level of Assistance 1: Contact guard (with increased time and effort. Pt. declined physical A)  Bed Mobility Comments 1: CGA for safety  Transfers  Transfer: Yes  Transfer 1  Technique 1: Sit to stand  Transfer Device 1:  (FWW)  Transfer Level of Assistance 1: Minimum assistance  Trials/Comments 1: A for steadying.  Transfers 2  Technique 2: Stand to sit  Transfer Device 2:  (FWW)  Transfer Level of Assistance 2: Contact guard  Trials/Comments 2: CGA for safety  Ambulation/Gait Training  Ambulation/Gait Training Performed: Yes  Ambulation/Gait Training 1  Surface 1: Level tile  Device 1: Rolling walker  Assistance 1: Minimum assistance (x2)  Quality of Gait 1: Narrow base of support (slow, step-to gait with shortened step length, flexed posture)  Comments/Distance (ft) 1: 6'; A for balance, safety. VC's for walker placement  Stairs  Stairs: No       Extremity/Trunk Assessments:        RLE   RLE :  (AROM WFL, strength 4-/5)  LLE   LLE : Within Functional Limits    Outcome Measures:     [facility] Basic Mobility  Turning from your back to your side while in a flat bed without using bedrails: A little  Moving from lying on your back to sitting on the side of a flat bed without using bedrails: A little  Moving to and from bed to chair (including a wheelchair): A lot  Standing up from a chair using your arms (e.g. wheelchair or bedside chair): A little  To walk in hospital room: A lot  Climbing 3-5 steps with railing: Total  Basic Mobility - Total Score: 14                                         Goals:  Encounter Problems       Encounter Problems (Active)       PT Problem       Pt. will transfer supine/sit with SBA (Progressing)       Start:  06/18/24    Expected End:  07/02/24            Pt. will transfer sit/stand with FWW with SBA (Progressing)       Start:  06/18/24    Expected End:  07/02/24            Pt.will ambulate 40' with FWW with CGA (Progressing)       Start:  06/18/24    Expected End:  07/02/24            Pt. will perform 2 x 15 B LE AROM exercises  (Not Progressing)       Start:  06/18/24    Expected End:  07/02/24                 Education Documentation  Mobility Training, taught by Ramiro Cisneros, PT at 6/18/2024 12:09 PM.  Learner: Significant Other, Patient  Readiness: Acceptance  Method: Explanation  Response: Verbalizes Understanding, Needs Reinforcement  Comment: Role of PT, transfers, amb, safety, PT POC

## 2024-06-18 NOTE — PROGRESS NOTES
"Daily Progress Note    Yared Aquino is a 84 y.o. male on day 0 of admission presenting with Pubic ramus fracture, right, closed, initial encounter (Multi).    Subjective   Patient seen resting in bed.  Patient requesting something to eat.  Instructed patient pending MBS, patient with history of esophageal CA.  Patient agreeable.  Orthopedics following patient no surgical interventions weightbearing as tolerated okay for discharge.  Patient will need skilled nursing on discharge pending PT note.  Patient denies chest pain or shortness of breath.       Objective     Physical Exam    Physical Exam  Constitutional:       Appearance: He is cachectic.      Comments: Elderly and frail   HENT:      Head: Normocephalic.      Mouth/Throat:      Mouth: Mucous membranes are moist.   Eyes:      Pupils: Pupils are equal, round, and reactive to light.   Cardiovascular:      Rate and Rhythm: Normal rate and regular rhythm.      Heart sounds: Normal heart sounds, S1 normal and S2 normal.   Pulmonary:      Effort: Pulmonary effort is normal.      Breath sounds: Normal breath sounds.   Abdominal:      General: Bowel sounds are normal.      Palpations: Abdomen is soft.   Musculoskeletal:      Cervical back: Neck supple.      Comments: Decreased ROM to BLE due to pubic ramus fracture   Skin:     General: Skin is warm.   Neurological:      Mental Status: He is alert and oriented to person, place, and time.      Motor: Weakness present.   Psychiatric:         Mood and Affect: Mood normal.         Behavior: Behavior normal.         Last Recorded Vitals  Blood pressure 151/77, pulse 77, temperature 36.3 °C (97.3 °F), resp. rate 16, height 1.88 m (6' 2.02\"), weight 69.3 kg (152 lb 12.5 oz), SpO2 94%.  Intake/Output last 3 Shifts:  No intake/output data recorded.    Medications  Scheduled medications  aspirin, 81 mg, oral, Daily  docusate sodium, 100 mg, oral, BID  felodipine ER, 5 mg, oral, Daily  furosemide, 10 mg, oral, " Daily  pantoprazole, 40 mg, oral, Daily   Or  pantoprazole, 40 mg, intravenous, Daily  psyllium, 1 packet, oral, Daily  simvastatin, 20 mg, oral, Nightly      Continuous medications     PRN medications  PRN medications: acetaminophen **OR** acetaminophen **OR** acetaminophen, benzocaine-menthol, diclofenac sodium, melatonin, ondansetron **OR** ondansetron    Labs  CBC:   Results from last 7 days   Lab Units 06/18/24  0534 06/17/24  2106   WBC AUTO x10*3/uL 6.4 7.5   RBC AUTO x10*6/uL 3.63* 4.04*   HEMOGLOBIN g/dL 11.9* 13.1*   HEMATOCRIT % 36.3* 40.2*   MCV fL 100 100   MCH pg 32.8 32.4   MCHC g/dL 32.8 32.6   RDW % 13.1 13.2   PLATELETS AUTO x10*3/uL 181 234     CMP:    Results from last 7 days   Lab Units 06/18/24  0534 06/17/24  2106   SODIUM mmol/L 139 139   POTASSIUM mmol/L 3.8 4.5   CHLORIDE mmol/L 109* 106   CO2 mmol/L 22 24   BUN mg/dL 35* 39*   CREATININE mg/dL 1.11 1.30   GLUCOSE mg/dL 92 135*   PROTEIN TOTAL g/dL 6.3* 6.9   CALCIUM mg/dL 8.7 9.2   BILIRUBIN TOTAL mg/dL 0.4 0.4   ALK PHOS U/L 109 115   AST U/L 15 19   ALT U/L 11 11     BMP:    Results from last 7 days   Lab Units 06/18/24  0534 06/17/24  2106   SODIUM mmol/L 139 139   POTASSIUM mmol/L 3.8 4.5   CHLORIDE mmol/L 109* 106   CO2 mmol/L 22 24   BUN mg/dL 35* 39*   CREATININE mg/dL 1.11 1.30   CALCIUM mg/dL 8.7 9.2   GLUCOSE mg/dL 92 135*     Magnesium:  Results from last 7 days   Lab Units 06/18/24  0534   MAGNESIUM mg/dL 1.91     Troponin:    Results from last 7 days   Lab Units 06/17/24  2210 06/17/24  2106   TROPHS ng/L 6 8     BNP:     Lipid Panel:         Relevant Results  Results from last 7 days   Lab Units 06/18/24  0534 06/17/24 2106   GLUCOSE mg/dL 92 135*     Lab Results   Component Value Date    HGBA1C 6.1 (H) 07/19/2023        Assessment/Plan    Mechanical fall  Acute right pubic ramus fracture  Permanent A-fib SP/Watchman device  Laryngeal cancer  Chronic back pain  Chronic diastolic CHF  HTN/HLD  Failure to thrive in  adult  CABG  GERD  Depression  MI at age 51    -Consult Ortho, x-rays negative, weightbearing as tolerated  -Pain meds as needed  -SLP for history of esophageal cancer, recommend MBS  -N.p.o. pending MBS results  -CBC BMP daily  -Resume home meds  -PT/OT evaluation  -TCC for discharge planning      DVTp: Deferred for now    PLAN: Will need skilled nursing    Rafia Mcgarry, AYUSH-CNP    Plan of care was discussed extensively with patient.  Patient verbalized understanding through teach back method.  All question and concerns addressed upon examination.    Of note, this documentation is completed using the Dragon Dictation system (voice recognition software). There may be spelling and/or grammatical errors that were not corrected prior to final submission.

## 2024-06-18 NOTE — PROGRESS NOTES
Speech-Language Pathology    SLP Adult Inpatient Speech-Language Pathology Clinical Swallow Evaluation    Patient Name: Yared Aquino  MRN: [id number]  Today's Date: 6/18/2024   Time Calculation  Start Time: 1143  Stop Time: 1215  Time Calculation (min): 32 min         Current Problem:   1. Fall, initial encounter        2. Closed fracture of ramus of right pubis, initial encounter (Multi)        3. 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]            Recommendations:      Solid Diet Recommendations : Regular (IDDSI Level 7), Easy to Chew   Liquid Diet Recommendations: Thin (IDDSI Level 0)   Compensatory Swallowing Strategies: Upright 90 degrees as possible for all oral intake, Remain upright for 20-30 minutes after meals, Alternate solids and liquids, Small bites/sips, Eat/feed slowly     Medication Administration Recommendations: Other (Comment)      Assessment:  Consistencies Trialed: Consistencies Trialed: Thin (IDDSI Level 0) - Cup, Thin (IDDSI Level 0) - Straw, Regular (IDDSI Level 7), Soft & bite sized/chopped (IDDSI Level 6)      Lingual Strength: Reduced   Dentition: Dentures (Upper Partial ), Dentures (Lower Partial)   Assessment Results: Patient presents with mild-mod oral phase dysphagia and suspected pharyngeal dysphagia upon completion of a clinical swallow evaluation this date. Oral motor assessment remarkable for reduced lingual/labial strength and agility. Speech is dysarthric characterized by reduced volume and imprecise articulation which is at baseline. Patient prepared his partials prior to eating lunch meal. Occasional anterior spillage of thin liquids observed x1 with straw and x1 with a milk carton. Spouse reports this does not usually happen. Solid trial characterized by slow, inefficient mastication with small pieces unchewed (ie: piece of noodle). With time, the patient was able to clear oral cavity. No overt s/s of aspiration observed with thin liquid. Based on the results of the assessment a  modified barium swallow study is recommended to fully assess swallow physiology. Recommend solids be changed to Easy to Chew. Continue with thin liquids.  Prognosis: Fair   Medical Staff Made Aware: Yes     Baseline Assessment:  Baseline Assessment  Respiratory Status: Room air  Patient Positioning: Upright in Chair     Relevant Imaging:  CXR IMPRESSION:  Cardiomegaly and chronic changes without acute process.  Contour abnormality suggesting hiatal hernia.    Plan:  SLP Plan: Skilled SLP Skilled speech therapy for dysphagia treatment is warranted in order to provide training and instruction regarding the use of compensatory swallow strategies, oropharyngeal strengthening exercises, and pt/caregiver education in order to reduce risk of aspiration, dehydration and malnutrition.  SLP Frequency:  (To be dettermined following MBSS)   Duration:  (TBD per MBSS)   Next Treatment Priority: MBSS   Discussed POC: Patient, Nursing, Caregiver/family, Physician   Discussed Risks/Benefits: Yes   Patient/Caregiver Agreeable: Yes   Patient will be discontinued from speech therapy when no further skilled needs are identified in this setting.   SLP Discharge Recommendations:  (Home)    Goals: Established 6/18/24  Patient will:  Patient/Caregiver will demonstrate knowledge of taught compensatory strategies and dietary consistency recommendations to optimize functional swallow function without overt clinical signs and symptoms of aspiration or dysphagia  Complete a modified barium swallow study (MBSS) as warranted upon further assessment/discussion with medical team for objective assessment of oropharyngeal swallow function, to assess for aspiration, and to guide further recommendations and treatment plan.    Subjective   The patient receives his care at [facility]. He had recently seen his otolaryngologist on 5/29/24 who recommended a modified barium swallow study which is currently scheduled as an outpatient procedure. The  patient/spouse are agreeable to doing the study as an inpatient. The patient is not receiving chemoradiation therapy at this time.  The spouse reports that the patient typically eats whatever he wants at home but she cuts up food into small pieces and he tends to avoid hard fruits and vegetables. He loves desserts and [employer] strawberry milkshakes.    General Visit Information:  Pt. Admitted  6/17/24  Past medical history: Past Medical History Relevant to Rehab: 84y old male admitted for a mechanical fall at home. History of  HLD, MI, neuropathy, CHF, A-fb, CKD, GERD, B JACKELYN with ORIF R femur, Watchman's, falls, LBP, laryngeal CA, sciatica, tricuspid valve regurgitation, CABG, [place], MDD. Patient's spouse reports occasional coughing during meals, both liquids and solids. Remote history of pneumonia (>10yrs ago).     Living Environment: Other (comment), Home     Ordering Physician: AYUSH Barajas-CNP     Reason for Referral: Patient was seen for a clinical swallow evaluation (CSE) to assess swallow function, determine least restrictive diet, determine if a modified barium swallow study is warranted and develop appropriate POC for the current setting.      Current Diet : Thin liquids   Prior to Session Communication: Bedside nurse   Pain:  Pain Assessment  Pain Assessment: 0-10  Pain Score: 0 - No pain     Education:  Learner:  Patient, Significant other  Barriers to Learning: Acuteness of illness  Method: Verbal  Education - Topic: ST provided patient education regarding role of ST, purpose of assessment, clinical impressions, goals of treatment, and plan of care. Patient verbalized full comprehension, consistent with cognitive status. Education will be reinforced. ST further coordinated with RN regarding recommendations and precautions per this assessment, with RN verbalizing understanding.  Outcome:  needs review/reinforcement

## 2024-06-18 NOTE — CONSULTS
Orthopedics Consult Note  Patient: Yared Aquino  Unit/Bed: 1106/1106-A  YOB: 1940  MRN: [id number]  Acct: [id number]   Admitting Diagnosis: Fall, initial encounter [W19.XXXA]  Pubic ramus fracture, right, closed, initial encounter (Multi) [S32.591A]  Closed fracture of ramus of right pubis, initial encounter (Multi) [S32.591A]  Date:  6/17/2024  Hospital Day: 0  Attending: Finesse Mccormick MD         Complaint:  Chief Complaint   Patient presents with    Fall     Patient fell backyards in the kitchen. No LOC or thinners. No nausea or vomiting.         History of Present Illness:  84-year-old male with extensive past medical history as well as past surgical history presents with history of multiple mechanical type falls.  Patient has a history of atrial fibrillation, laryngeal cancer, peripheral neuropathy presents today as result of mechanical fall in kitchen.  Denies any anticoagulation.  Patient states he primarily has pain about the right hip/right pelvis region.  Orthopedic surgery was consulted for evaluation of the right hip as well as findings of superior and inferior pubic rami fractures.  Patient does not recall prior surgeries about his right hip does not recall prior surgeon.  Prior to fall was not really having any issues.    Allergies:  Allergies   Allergen Reactions    Ace Inhibitors Dry mouth    Codeine Other and Unknown     unknown    can't remember; tolerated morphine and hydromorphone ok in the past    Dabigatran Etexilate Other and Unknown     Other reaction(s): Other: See Comments    Influenza Virus Vaccines Other     Intolerance    Metoprolol Unknown     Other reaction(s): Intolerance   Patient with exaggerated bradycardic effect of metoprolol and other beta blockers    Patient with exaggerated bradycardic effect of metoprolol and other beta blockers    Scopolamine Other     Mental status changes        PMHx:  History reviewed. No pertinent past medical  history.    PSHx:  History reviewed. No pertinent surgical history.    Social Hx:  Social History     Socioeconomic History    Marital status:      Spouse name: None    Number of children: None    Years of education: None    Highest education level: None   Occupational History    None   Tobacco Use    Smoking status: Former     Current packs/day: 0.00     Types: Cigarettes     Quit date:      Years since quittin.4    Smokeless tobacco: Never   Vaping Use    Vaping status: Never Used   Substance and Sexual Activity    Alcohol use: None    Drug use: None    Sexual activity: None   Other Topics Concern    None   Social History Narrative    None     Social Determinants of Health     Financial Resource Strain: Low Risk  (2024)    Overall Financial Resource Strain (CARDIA)     Difficulty of Paying Living Expenses: Not hard at all   Food Insecurity: No Food Insecurity (11/15/2023)    Received from [facility]    Hunger Vital Sign     Worried About Running Out of Food in the Last Year: Never true     Ran Out of Food in the Last Year: Never true   Transportation Needs: No Transportation Needs (2024)    PRAPARE - Transportation     Lack of Transportation (Medical): No     Lack of Transportation (Non-Medical): No   Physical Activity: Insufficiently Active (2020)    Received from [facility]    Exercise Vital Sign     Days of Exercise per Week: 7 days     Minutes of Exercise per Session: 20 min   Stress: Stress Concern Present (2020)    Received from [facility]    [employer] [place] - Occupational Stress Questionnaire     Feeling of Stress : To some extent   Social Connections: Moderately Isolated (2020)    Received from [facility]    Social Connection and Isolation Panel [NHANES]     Frequency of Communication with Friends and Family: Once a week     Frequency of Social Gatherings with Friends and Family: Never     Attends Islam Services:  1 to 4 times per year     Active Member of Clubs or Organizations: No     Attends Club or Organization Meetings: Never     Marital Status:    Intimate Partner Violence: Not on file   Housing Stability: Low Risk  (6/18/2024)    Housing Stability Vital Sign     Unable to Pay for Housing in the Last Year: No     Number of Places Lived in the Last Year: 1     Unstable Housing in the Last Year: No       Family Hx:  No family history on file.    Review of Systems:   Review of Systems      Physical Examination:    Visit Vitals  /87   Pulse 71   Temp 36.1 °C (97 °F)   Resp 16      Physical Exam  General: No acute distress alert and oriented x 3 cachectic appearing.  Focused Exam of right lower extremity/right hip: Overlying skin is clean dry intact prior incision well-healed.  No surrounding erythema no edema appreciated about the hip.  Mild if any tenderness palpation about the greater trochanter.  Compartments are soft and compressible with negative logroll.  Able to perform a straight leg raise without difficulty.  Nontender palpation about the femur knee leg foot and ankle.  Chronic venous stasis changes about the foot and ankle.  Extremity is warm and well-perfused.    LABS:  LABS:  CBC:   Results from last 7 days   Lab Units 06/18/24  0534 06/17/24  2106   WBC AUTO x10*3/uL 6.4 7.5   RBC AUTO x10*6/uL 3.63* 4.04*   HEMOGLOBIN g/dL 11.9* 13.1*   HEMATOCRIT % 36.3* 40.2*   MCV fL 100 100   MCH pg 32.8 32.4   MCHC g/dL 32.8 32.6   RDW % 13.1 13.2   PLATELETS AUTO x10*3/uL 181 234     CMP:    Results from last 7 days   Lab Units 06/18/24  0534 06/17/24  2106   SODIUM mmol/L 139 139   POTASSIUM mmol/L 3.8 4.5   CHLORIDE mmol/L 109* 106   CO2 mmol/L 22 24   BUN mg/dL 35* 39*   CREATININE mg/dL 1.11 1.30   GLUCOSE mg/dL 92 135*   PROTEIN TOTAL g/dL 6.3* 6.9   CALCIUM mg/dL 8.7 9.2   BILIRUBIN TOTAL mg/dL 0.4 0.4   ALK PHOS U/L 109 115   AST U/L 15 19   ALT U/L 11 11       BMP:    Results from last 7 days   Lab  Units 06/18/24  0534 06/17/24  2106   SODIUM mmol/L 139 139   POTASSIUM mmol/L 3.8 4.5   CHLORIDE mmol/L 109* 106   CO2 mmol/L 22 24   BUN mg/dL 35* 39*   CREATININE mg/dL 1.11 1.30   CALCIUM mg/dL 8.7 9.2   GLUCOSE mg/dL 92 135*     Magnesium:  Results from last 7 days   Lab Units 06/18/24  0534   MAGNESIUM mg/dL 1.91     Troponin:    Results from last 7 days   Lab Units 06/17/24  2210 06/17/24  2106   TROPHS ng/L 6 8     BNP:     Lipid Panel:       Current Medications:    Current Facility-Administered Medications:     acetaminophen (Tylenol) tablet 650 mg, 650 mg, oral, q4h PRN, 650 mg at 06/18/24 0205 **OR** acetaminophen (Tylenol) oral liquid 650 mg, 650 mg, oral, q4h PRN **OR** acetaminophen (Tylenol) suppository 650 mg, 650 mg, rectal, q4h PRN, RAMIREZ Mejia    benzocaine-menthol (Cepastat Sore Throat) lozenge 1 lozenge, 1 lozenge, Mouth/Throat, q2h PRN, RAMIREZ Mejia    diclofenac sodium (Voltaren) 1 % gel 4 g, 4 g, Topical, 4x daily PRN, RAMIREZ Mejia    docusate sodium (Colace) capsule 100 mg, 100 mg, oral, BID, RAMIREZ Mejia, 100 mg at 06/18/24 0205    melatonin tablet 3 mg, 3 mg, oral, Nightly PRN, RAMIREZ Mejia    ondansetron (Zofran) tablet 4 mg, 4 mg, oral, q8h PRN **OR** ondansetron (Zofran) injection 4 mg, 4 mg, intravenous, q8h PRN, RAMIREZ Mejia    pantoprazole (ProtoNix) EC tablet 40 mg, 40 mg, oral, Daily **OR** pantoprazole (ProtoNix) injection 40 mg, 40 mg, intravenous, Daily, RAMIREZ Mejia, 40 mg at 06/18/24 0556    psyllium (Metamucil) 1 packet, 1 packet, oral, Daily, Jodie Graf, APRN-CNP    CT femur right wo IV contrast    Result Date: 6/17/2024  STUDY: CT Right Femur; Completed Time:  6/17/2024 at 9:54 PM INDICATION: Status post fall. COMPARISON: None available. ACCESSION NUMBER(S): [id number] ORDERING CLINICIAN: SIENNA TAMAYO TECHNIQUE:  Thin section axial images were obtained through the right  femur without intravenous contrast.  Orthogonal reconstructed images were obtained and reviewed.  Automated mA/kV exposure control was utilized and patient examination was performed in strict accordance with principles of ALARA. FINDINGS:  Limited visualization of the pelvis. There is a mildly displaced acute appearing fracture of the inferior pubic ramus on the right. There are prominent atherosclerotic calcifications. Prior right hip replacement. I believe there has been probable revision of the hardware with a long intramedullary abelardo surrounded by multiple wires. Overall alignment is anatomic. There is a subtle linear lucency in the cortex of the right mid femur, along the anterior and medial aspect (series #304, slice 116 and surrounding images) (series 305, images 60 and surrounding slices). I suspect this is a prior diaphyseal fracture post surgical fixation but without prior for comparison it is difficult to exclude an acute fracture. The lucency is still visible without significant callus formation. Correlate with history and ideally with prior images. The remainder of the right femur appears intact.    1.Mildly displaced acute appearing fracture of the inferior pubic ramus on the right. 2.Prior right hip replacement. I believe there has been probable revision of the hardware with a long intramedullary abelardo surrounded by multiple wires. Overall alignment is anatomic. 3.There is a subtle linear lucency in the cortex of the right mid femur, along the anterior and medial aspect. I suspect this is a prior diaphyseal fracture post surgical fixation but without prior for comparison it is difficult to exclude an acute fracture. The lucency is still visible without significant callus formation. Correlate with history and ideally with prior images. Signed by Parth King MD    CT pelvis wo IV contrast    Result Date: 6/17/2024  STUDY: CT Pelvis without IV Contrast; 06/17/2024 at 9:36 PM INDICATION: Trauma evaluation  status post fall. COMPARISON: Correlation with CT right femur imaging of same date, 06/17/24. ACCESSION NUMBER(S): [id number] ORDERING CLINICIAN: SIENNA TAMAYO TECHNIQUE:  Thin section axial images were obtained through the pelvis without intravenous contrast.  Orthogonal reconstructed images were obtained and reviewed.  Automated mA/kV exposure control was utilized and patient examination was performed in strict accordance with principles of ALARA. FINDINGS: Pelvis: Small umbilical hernia. Prominent atherosclerotic vascular calcifications. Prominent colonic diverticulosis without associated inflammatory changes. Bony visualization is limited due to diffuse demineralization an artifact from metal orthopedic hardware. The patient has had prior bilateral hip replacements which appear anatomic. There is a mildly displaced acute appearing fracture of the inferior pubic ramus on the right. No other fractures are seen with certainty. There are prominent degenerative changes in the visualized lower lumbar spine..    1.Mildly displaced acute appearing fracture of the inferior pubic ramus on the right. No other fractures are seen with certainty. 2.Prior bilateral hip replacements which appear anatomic. 3.Prominent atherosclerotic vascular calcifications. 4.Prominent colonic diverticulosis without associated inflammatory changes. 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  Patient does not have any tenderness palpation about the femur therefore I do have low concerns for periprosthetic fracture.  Regarding right inferior pubic rami fracture recommend weight-bear as tolerated.  PT OT  No acute surgical intervention.  Weight-bear as tolerated.  Will definitively update plan after x-rays about the femur obtained  Discussed importance of fall prevention  Patient will follow-up in 3 to 4 weeks time  X-rays at follow-up AP pelvis.                  Electronically signed by Juan Vidales MD on 6/18/2024 at 6:55 AM

## 2024-06-18 NOTE — PROGRESS NOTES
Occupational Therapy    Evaluation    Patient Name: Yared Aquino  MRN: [id number]  Today's Date: 6/18/2024  Time Calculation  Start Time: 1027  Stop Time: 1050  Time Calculation (min): 23 min        Assessment:  OT Assessment: Pt with ADL impairment including deficits with LB dressing and functional transfers.  Prognosis: Good  Barriers to Discharge: Decreased caregiver support  Evaluation/Treatment Tolerance: Patient tolerated treatment well  Medical Staff Made Aware: Yes  End of Session Communication: Bedside nurse  End of Session Patient Position: Up in chair, Alarm on (call light in reach; spouse and relative present)  OT Assessment Results: Decreased ADL status, Decreased functional mobility  Prognosis: Good  Barriers to Discharge: Decreased caregiver support  Evaluation/Treatment Tolerance: Patient tolerated treatment well  Medical Staff Made Aware: Yes  Strengths: Support of Caregivers, Living arrangement secure, Housing layout, Ability to acquire knowledge, Access to adaptive/assistive products  Barriers to Participation: Comorbidities, Support of Caregivers  Plan:  Treatment Interventions: ADL retraining, Functional transfer training  OT Frequency: 3 times per week  OT Discharge Recommendations: Moderate intensity level of continued care  OT Recommended Transfer Status: Moderate assist (FWW)  OT - OK to Discharge: Yes (Pt ok to d/c to next level of care pending medical clearance by team.)  Treatment Interventions: ADL retraining, Functional transfer training    Subjective   Current Problem:  1. Fall, initial encounter        2. Closed fracture of ramus of right pubis, initial encounter (Multi)          General:  General  Reason for Referral: ADL impairment  Referred By: PT/OT AYALA Graf CNP 6/18  Past Medical History Relevant to Rehab: includes: HLD, MI, neuropathy, CHF, A-fb, CKD, GERD, B JACKELYN with ORIF R femur, Watchman's, falls, LBP, laryngeal CA, sciatica, tricuspid valve regurgitation, CABG, Native,  MDD  Family/Caregiver Present: Yes  Caregiver Feedback: Wife and relative came at end of session and was supportive  Prior to Session Communication: Bedside nurse  Patient Position Received: Bed, 3 rail up, Alarm on  Preferred Learning Style: auditory, verbal  General Comment: Pt. is an 83yo who presented to [facility] ED on 6/17/2024 following a fall. Pt. c/o pain in R thigh. NIH=0. In ED /96. Pelvic CT (6/17) (+) acute R inferior pubic ramus fracture R hip CT (6/17) (+) Prior R JACKELYN with probable  revision of the hardware with a long intramedullary abelardo surrounded by multiple wires. There is a subtle linear lucency in the cortex of the R mid femur, along the anterior and medial aspect. Radiologist suspect this is a prior  diaphyseal fracture post surgical fixation but without prior for  comparison it is difficult to exclude an acute fracture.  R hip XR (6/18) Intact right hip arthroplasty with no identifiable acute adjacent  fracture. Old fracture deformity of the mid femoral diaphysis however.  Head CT (6/17) (-) acute findings  C-spine CT (6/17) (-) acute findings  CXR (-) acute findings  Dx: Pelvic fracture, fall  Precautions:  Medical Precautions: Fall precautions (Activity order: OOB with assist)  Precautions Comment: Per EMR: High fall risk  Vital Signs:     Pain:  Pain Assessment  Pain Assessment: 0-10  Pain Score: 3 (with movement; no pain at rest)  Pain Type: Acute pain  Pain Location: Groin  Pain Orientation: Right  Pain Interventions: Repositioned    Objective   Cognition:  Overall Cognitive Status: Within Functional Limits  Processing Speed: Delayed           Home Living:  Home Living Comments: Pt lives with spouse in a 1 level home with 2 FADI and bilateral hand rails. Bed/bath on 1st floor. Bathroom has walk in shower with seat and grab bars. Laundry is on 1st floor.  Prior Function:  Prior Function Comments: Pt reports independent with ADLs PTA and shares IADLs with spouse including light meal prep and  laundry. Pt utilizes cleaning service. Pt ambulates with FWW prior to admission. Owns cane and rollater walker. 3 falls in last 3 months. - drives.  IADL History:     ADL:  Eating Assistance: Modified independent (Device)  Eating Deficit: Setup  Grooming Assistance: Minimal  Grooming Deficit: Standing with assistive device, Increased time to complete, Supervision/safety  Bathing Assistance: Moderate  Bathing Deficit: Left lower leg including foot, Right lower leg including foot, Right upper leg, Left upper leg, Increased time to complete , Supervision/safety  UE Dressing Assistance: Stand by  UE Dressing Deficit: Increased time to complete, Supervision/safety, Verbal cueing  LE Dressing Assistance: Minimal  LE Dressing Deficit: Thread LLE into underwear, Thread RLE into underwear, Thread LLE into pants, Thread RLE into pants, Don/doff L sock, Don/doff R sock, Increased time to complete, Supervision/safety, Verbal cueing  Toileting Assistance with Device: Minimal  Toileting Deficit: Increased time to complete, Supervison/safety, Verbal cueing, Clothing management down, Clothing management up  Functional Assistance: Minimal  Functional Deficit: Increased time to complete, Toilet transfer, Supervision/safety, Verbal cueing  ADL Comments: Pt able to adjust ankle portion of socks while seated EOB but requires Viki to adjust socks across toes. Requires Viki x2 for stand pivot from bed to chair with difficulty managing FWW and moderate verbal cueing for sequencing.  Activity Tolerance:  Endurance: Decreased tolerance for upright activites  Bed Mobility/Transfers: Bed Mobility  Bed Mobility: Yes  Bed Mobility 1  Bed Mobility 1: Supine to sitting  Level of Assistance 1: Contact guard (with increased time and effort. Pt. declined physical A)  Bed Mobility Comments 1: CGA for safety as pt denies assistance.    Transfers  Transfer: Yes  Transfer 1  Transfer From 1: Sit to  Transfer to 1: Stand  Technique 1: Sit to  stand  Transfer Device 1:  (FWW)  Transfer Level of Assistance 1: Minimum assistance  Trials/Comments 1: Requires assistance for steadying. Verbal cueing for hand placement and sequencing of transfer.  Transfers 2  Transfer From 2: Stand to  Transfer to 2: Sit  Technique 2: Stand to sit  Transfer Device 2:  (FWW)  Transfer Level of Assistance 2: Contact guard  Trials/Comments 2: CGA for safety and steadying for controlled descent with verbal cueing for hand placement and sequencing of transfer.      Sitting Balance:  Static Sitting Balance  Static Sitting-Balance Support: Feet supported, No upper extremity supported  Static Sitting-Level of Assistance: Contact guard  Dynamic Sitting Balance  Dynamic Sitting-Balance Support: Feet supported, No upper extremity supported  Dynamic Sitting-Balance: Forward lean, Reaching across midline  Dynamic Sitting-Comments: To adjust socks with CGA. Joe for dressing task.  Standing Balance:  Static Standing Balance  Static Standing-Balance Support: Bilateral upper extremity supported  Static Standing-Level of Assistance: Minimum assistance  Dynamic Standing Balance  Dynamic Standing-Balance Support: Bilateral upper extremity supported  Dynamic Standing-Balance: Forward lean, Turning  Dynamic Standing-Comments: To complete transfer to chair     Sensation:  Sensation Comment: Pt reports neuropathy with occasional right heel pain 8/10  Strength:  Strength Comments: Strength grossly 4-/5. L shoulder strength not tested due to limited ROM. Distal strength WFL.    Coordination:  Movements are Fluid and Coordinated: Yes   Hand Function:  Gross Grasp: Functional  Coordination: Functional  Extremities: RUE   RUE : Within Functional Limits and LUE   LUE: Exceptions to WFL (L shoulder ROM limited to 45 degrees. Distal ROM WFL.)        Outcome Measures:[facility] Daily Activity  Putting on and taking off regular lower body clothing: A little  Bathing (including washing, rinsing, drying): A  little  Putting on and taking off regular upper body clothing: A little  Toileting, which includes using toilet, bedpan or urinal: A lot  Taking care of personal grooming such as brushing teeth: A little  Eating Meals: None  Daily Activity - Total Score: 18        Education Documentation  Body Mechanics, taught by Domonique Keane OT at 6/18/2024 12:19 PM.  Learner: Patient  Readiness: Acceptance  Method: Explanation  Response: Needs Reinforcement, Verbalizes Understanding    Precautions, taught by Domonique Keane OT at 6/18/2024 12:19 PM.  Learner: Patient  Readiness: Acceptance  Method: Explanation  Response: Needs Reinforcement, Verbalizes Understanding    ADL Training, taught by Domonique Keane OT at 6/18/2024 12:19 PM.  Learner: Patient  Readiness: Acceptance  Method: Explanation  Response: Needs Reinforcement, Verbalizes Understanding    Education Comments  No comments found.      Goals:  Encounter Problems       Encounter Problems (Active)       OT Goals       Pt will complete LB dressing with [place] for use of AE.   (Progressing)       Start:  06/18/24    Expected End:  07/02/24            Patient will transfer to bed/chair/toilet with SBA and use of FWW. (Progressing)       Start:  06/18/24    Expected End:  07/02/24            Pt will ambulate functional household distance with [place] of use of FWW to complete I/ADL task.   (Progressing)       Start:  06/18/24    Expected End:  07/02/24

## 2024-06-18 NOTE — DISCHARGE INSTRUCTIONS
Weight-bearing as tolerated to bilateral lower extremities  Follow up in outpatient orthopedic clinic in 3-4 weeks with Dr. Juan Vidales. You will have repeat Xray at this time.   Please call the office to schedule appointment.

## 2024-06-18 NOTE — H&P
Medical Group History and Physical    ASSESSMENT & PLAN:     Mechanical fall  Acute Right pubic ramus fracture  - Consult Ortho  - N.p.o. after midnight  - PT/OT - has hx of multiple falls resulting in multiple fractures and ortho interventions  - TCC for discharge planning - likely will need placement    Permanent A-fib  S/p Watchman device  - No anticoagulation  - Rate controlled  - sensitivity to BB - listed as allergy - bradycardia    Hx Laryngeal cancer - speech impairment at baseline    VTE Prophylaxis: defer overnight; SCDs    Jodie Graf, APRN-CNP    HISTORY OF PRESENT ILLNESS:   Chief Complaint: mechanical fall    History Of Present Illness:    Yared Aquino is a 84 y.o. male with a significant past medical history of permanent A-fib s/p Watchman device and ELISSA ligation, multiple mechanical falls, laryngeal cancer, peripheral neuropathy, presenting to [place] ER after sustaining mechanical fall in kitchen.  Denies head trauma, no LOC, not on anticoagulation.  Patient does appear to be malnourished and extremely frail.  ER evaluation shows patient sustained a mildly displaced acute appearing fracture of the inferior pubic ramus on right.  Patient does endorse right hip pain when he attempts to move it, also c/o BLE muscular cramping.  Electrolytes are within normal limits, renal function at or near his baseline, GFR 55, glucose 135, no leukocytosis.  Patient is alert and oriented x 4, has speech impairment secondary to laryngeal cancer but this is not new.     Plan: PT OT evaluation and Ortho consult in AM.    Vital signs are stable patient is ready for admission under general medicine for the management of acute right pubic ramus fracture.       Review of systems: 10 point review of systems is otherwise negative except as mentioned above.    PAST HISTORIES:       Past Medical History:  Medical Problems       Problem List       * (Principal) Pubic ramus fracture, right, closed, initial encounter  (Multi)    Benign prostatic hyperplasia without urinary obstruction    Overview Signed 6/17/2024 11:24 PM by RAMIREZ Mejia     Formatting of this note might be different from the original.   April SNOMED Diagnostic import         Abnormal gait    Overview Signed 6/17/2024 11:24 PM by RAMIREZ Mejia     Formatting of this note might be different from the original.     Assessment: strength improving - now able to get himself up off of the floor; referred to new PT focusing on motor deficits per orthopedic surgery recommendations; did not proceed with OT evaluation for driving safety - pt is still driving, but no concerns raised   PLAN:    - advocated for continuing therapy exercises daily   - advised he also learn new PT exercises and integrate them into his routine   - advised he not stop doing therapy exercises even after formal therapy is completed   - monitor progress         Chronic bilateral low back pain without sciatica (Chronic)    Chronic diastolic CHF (congestive heart failure) (Multi)    Debility    Essential hypertension    Failure to thrive in adult    History of coronary artery bypass surgery (Chronic)    Gastroesophageal reflux disease without esophagitis    Hyperlipidemia    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 No pertinent surgical history.   Bilateral hip replacements  Watchman   ELISSA ligation      Social History:  He has no history on file for tobacco use, alcohol use, and  "drug use.    Family History:  No family history on file.     Allergies:  Ace inhibitors, Codeine, Dabigatran etexilate, Influenza virus vaccines, Metoprolol, and Scopolamine  Sensitivity to BB - bradycardia  Narcotic pain meds - causes acute change in mental status - lethargy and hallucinations    OBJECTIVE:       Last Recorded Vitals:  Vitals:    06/17/24 2054 06/17/24 2057 06/17/24 2215   BP: 158/85  (!) 174/96   BP Location: Right arm  Right arm   Patient Position: Sitting  Lying   Pulse: 69  64   Resp: 20     Temp: 36.2 °C (97.2 °F)     TempSrc: Temporal     SpO2: 99% 99% 98%   Weight: 88.5 kg (195 lb)     Height: 1.88 m (6' 2\")         Last I/O:  No intake/output data recorded.    Physical Exam  Vitals and nursing note reviewed.   Constitutional:       Appearance: He is ill-appearing.      Comments: Weak, frail    HENT:      Head: Normocephalic and atraumatic.      Nose: Nose normal.      Mouth/Throat:      Mouth: Mucous membranes are moist.      Pharynx: Oropharynx is clear.   Eyes:      Extraocular Movements: Extraocular movements intact.      Conjunctiva/sclera: Conjunctivae normal.      Pupils: Pupils are equal, round, and reactive to light.   Cardiovascular:      Rate and Rhythm: Normal rate. Rhythm irregular.      Pulses: Normal pulses.      Heart sounds: Normal heart sounds.      Comments: Permanent Afib s/p watchman, controlled rate  Pulmonary:      Effort: Pulmonary effort is normal.      Breath sounds: Normal breath sounds.      Comments: Diminished bases  Abdominal:      General: Abdomen is flat. Bowel sounds are normal.      Palpations: Abdomen is soft.      Comments: Last BM 6/17/2024   Genitourinary:     Comments: Not assessed  Musculoskeletal:      Cervical back: Normal range of motion and neck supple.      Comments: Right leg, limited ROM; no edema, no open wounds or sores    Skin:     General: Skin is warm and dry.      Capillary Refill: Capillary refill takes less than 2 seconds. "   Neurological:      General: No focal deficit present.      Mental Status: He is alert and oriented to person, place, and time. Mental status is at baseline.      Motor: Weakness present.      Coordination: Coordination abnormal.      Gait: Gait abnormal.      Comments: Speech impairment d/t laryngeal cancer - at his baseline   Psychiatric:         Mood and Affect: Mood normal.         Behavior: Behavior normal.         Thought Content: Thought content normal.         Judgment: Judgment normal.           Scheduled Medications    PRN Medications    Continuous Medications      Outpatient Medications:   MEDICATION REC NEEDED BY PHARMACY - appreciate assistance  Prior to Admission medications    Not on File       LABS AND IMAGING:     Labs:  Results for orders placed or performed during the hospital encounter of 06/17/24 (from the past 24 hour(s))   CBC and Auto Differential   Result Value Ref Range    WBC 7.5 4.4 - 11.3 x10*3/uL    nRBC 0.0 0.0 - 0.0 /100 WBCs    RBC 4.04 (L) 4.50 - 5.90 x10*6/uL    Hemoglobin 13.1 (L) 13.5 - 17.5 g/dL    Hematocrit 40.2 (L) 41.0 - 52.0 %     80 - 100 fL    MCH 32.4 26.0 - 34.0 pg    MCHC 32.6 32.0 - 36.0 g/dL    RDW 13.2 11.5 - 14.5 %    Platelets 234 150 - 450 x10*3/uL    Neutrophils % 63.8 40.0 - 80.0 %    Immature Granulocytes %, Automated 0.8 0.0 - 0.9 %    Lymphocytes % 24.9 13.0 - 44.0 %    Monocytes % 8.2 2.0 - 10.0 %    Eosinophils % 1.6 0.0 - 6.0 %    Basophils % 0.7 0.0 - 2.0 %    Neutrophils Absolute 4.80 1.60 - 5.50 x10*3/uL    Immature Granulocytes Absolute, Automated 0.06 0.00 - 0.50 x10*3/uL    Lymphocytes Absolute 1.87 0.80 - 3.00 x10*3/uL    Monocytes Absolute 0.62 0.05 - 0.80 x10*3/uL    Eosinophils Absolute 0.12 0.00 - 0.40 x10*3/uL    Basophils Absolute 0.05 0.00 - 0.10 x10*3/uL   Comprehensive Metabolic Panel   Result Value Ref Range    Glucose 135 (H) 74 - 99 mg/dL    Sodium 139 136 - 145 mmol/L    Potassium 4.5 3.5 - 5.3 mmol/L    Chloride 106 98 - 107  mmol/L    Bicarbonate 24 21 - 32 mmol/L    Anion Gap 14 10 - 20 mmol/L    Urea Nitrogen 39 (H) 6 - 23 mg/dL    Creatinine 1.30 0.50 - 1.30 mg/dL    eGFR 55 (L) >60 mL/min/1.73m*2    Calcium 9.2 8.6 - 10.3 mg/dL    Albumin 4.0 3.4 - 5.0 g/dL    Alkaline Phosphatase 115 33 - 136 U/L    Total Protein 6.9 6.4 - 8.2 g/dL    AST 19 9 - 39 U/L    Bilirubin, Total 0.4 0.0 - 1.2 mg/dL    ALT 11 10 - 52 U/L   Troponin I, High Sensitivity, Initial   Result Value Ref Range    Troponin I, High Sensitivity 8 0 - 20 ng/L   Troponin, High Sensitivity, 1 Hour   Result Value Ref Range    Troponin I, High Sensitivity 6 0 - 20 ng/L        Imaging:  XR chest 1 view   Final Result   Cardiomegaly and chronic changes without acute process.  Contour   abnormality suggesting hiatal hernia.   Signed by Bryce Barnett,       CT pelvis wo IV contrast   Final Result   1.Mildly displaced acute appearing fracture of the inferior pubic   ramus on the right. No other fractures are seen with certainty.   2.Prior bilateral hip replacements which appear anatomic.   3.Prominent atherosclerotic vascular calcifications.   4.Prominent colonic diverticulosis without associated   inflammatory changes.   Signed by Parth King MD      CT femur right wo IV contrast   Final Result   1.Mildly displaced acute appearing fracture of the inferior pubic   ramus on the right.   2.Prior right hip replacement. I believe there has been probable   revision of the hardware with a long intramedullary abelardo surrounded by   multiple wires. Overall alignment is anatomic.   3.There is a subtle linear lucency in the cortex of the right mid   femur, along the anterior and medial aspect. I suspect this is a prior   diaphyseal fracture post surgical fixation but without prior for   comparison it is difficult to exclude an acute fracture. The lucency   is still visible without significant callus formation. Correlate with   history and ideally with prior images.   Signed by  Parth King MD      CT head wo IV contrast   Final Result   No evidence of acute intracranial hemorrhage or depressed calvarial   fracture.        Cerebral atrophy and chronic periventricular white matter small   vessel ischemic change.        MACRO:   None        Signed by: Maikel Solitario 6/17/2024 9:52 PM   Dictation workstation:   [id number]      CT cervical spine wo IV contrast   Final Result   No evidence of acute fracture of the cervical spine.        Multilevel degenerative change of the cervical spine.        MACRO:   None        Signed by: Maikel Solitario 6/17/2024 9:55 PM   Dictation workstation:   [id number]

## 2024-06-18 NOTE — CONSULTS
"Nutrition Initial Assessment:   Nutrition Assessment         Patient is a 84 y.o. male presenting with pubic ramus fracture, right, closed      Nutrition History:  Food and Nutrient History: RD consulted for MST = 3, with comment of \"teeth and mouth hurt and also h/o laryngeal cancer\". Pt states usually eats regular textures/consistencies at home. Spouse cuts food up at home, thogh usually eats cereal for breakfast, sandwich for lunch, and frozen meal for dinner. Avoids crunchy foods. Likes sweets. Drinks coffee, water, and Ensure. Ensure quantity varies per day. Is agreeable to strawberry Ensure TID. S/p fall resulting in right pubic ramus fracture, no plans for surgery. Requested SLP sonal. Plans for MBS this afternoon.  Vitamin/Herbal Supplement Use: none  Food Allergies/Intolerances:  None  GI Symptoms: None  Oral Problems: Chewing difficulty and Swallowing difficulty       Anthropometrics:  Height: 188 cm (6' 2.02\")   Weight: 69.3 kg (152 lb 12.5 oz)   BMI (Calculated): 19.61             Weight History:     Wt Readings from Last 10 Encounters:   06/18/24 69.3 kg (152 lb 12.5 oz)         Weight Change %:  Weight History / % Weight Change: UBW ~160 lbs per pt. 6/19/23: 75.3 kg. 7/19/23: 74.8 kg. 9/21/23: 75.9 kg  Significant Weight Loss: No    Nutrition Focused Physical Exam Findings:    Subcutaneous Fat Loss:   Orbital Fat Pads: Severe (dark circles, hollowing and loose skin)  Buccal Fat Pads: Severe (hollow, sunken and narrow face)  Triceps: Severe (negligible fat tissue)  Ribs: Defer  Muscle Wasting:  Temporalis: Severe (hollowed scooping depression)  Pectoralis (Clavicular Region): Severe (protruding prominent clavicle)  Deltoid/Trapezius: Mild-Moderate (slight protrusion of acromion process)  Interosseous: Severe (depressed area between thumb and forefinger)  Trapezius/Infraspinatus/Supraspinatus (Scapular Region): Defer  Quadriceps: Defer  Gastrocnemius: Defer  Edema:  Edema: none  Physical Findings:  Skin: " Negative (for pressure injuries)    Nutrition Significant Labs:  BMP Trend:   Results from last 7 days   Lab Units 06/18/24  0534 06/17/24  2106   GLUCOSE mg/dL 92 135*   CALCIUM mg/dL 8.7 9.2   SODIUM mmol/L 139 139   POTASSIUM mmol/L 3.8 4.5   CO2 mmol/L 22 24   CHLORIDE mmol/L 109* 106   BUN mg/dL 35* 39*   CREATININE mg/dL 1.11 1.30        Nutrition Specific Medications:  Reviewed    I/O:    ;      Dietary Orders (From admission, onward)       Start     Ordered    06/18/24 1312  Adult diet Regular; Easy to chew; Thin 0  Diet effective now        Question Answer Comment   Diet type Regular    Texture Easy to chew    Fluid consistency Thin 0        06/18/24 1311                     Estimated Needs:   Total Energy Estimated Needs (kCal): 2080 kCal  Method for Estimating Needs: 30 kcal/kg  Total Protein Estimated Needs (g): 94 g  Method for Estimating Needs: 83-104g (1.2-1.5 g/kg)  Total Fluid Estimated Needs (mL): 1730 mL  Method for Estimating Needs: 25 ml/kg or per MD        Nutrition Diagnosis   Malnutrition Diagnosis  Patient has Malnutrition Diagnosis: Yes  Diagnosis Status: New  Malnutrition Diagnosis: Severe malnutrition related to chronic disease or condition  As Evidenced by: severe muscle losses, severe fat losses            Nutrition Interventions/Recommendations         Nutrition Prescription:  Individualized Nutrition Prescription Provided for : Regular diet. Textures/consistencies per SLP. Oral nutritional supplements        Nutrition Interventions:   Interventions: Meals and snacks, Medical food supplement  Goal: Consumes 3 meals per day  Medical Food Supplement: Commercial beverage  Goal: Ensure Plus High Protein TID (provides 350 kcal, 20 g protein per serving)    Collaboration and Referral of Nutrition Care: Collaboration by nutrition professional with other providers  Goal: secure chat with CNP; secure chat with SLP    Nutrition Education:          Nutrition Monitoring and Evaluation    Food/Nutrient Related History Monitoring  Monitoring and Evaluation Plan: Energy intake, Amount of food, Fluid intake  Energy Intake: Estimated energy intake  Criteria: Pt meets >75% of estimated energy needs  Fluid Intake: Estimated fluid intake  Criteria: Meets >75% of estimated fluid needs  Amount of Food: Estimated amout of food, Medical food intake  Criteria: Pt consumes >75% of meals and supplements    Body Composition/Growth/Weight History  Monitoring and Evaluation Plan: Weight  Weight: Measured weight  Criteria: Maintains stable weight    Biochemical Data, Medical Tests and Procedures  Monitoring and Evaluation Plan: Glucose/endocrine profile, Electrolyte/renal panel  Electrolyte and Renal Panel: Sodium, Potassium, Phosphorus  Criteria: Electrolytes WNL  Glucose/Endocrine Profile: Glucose, casual  Criteria: BG within desirable range         Time Spent/Follow-up Reminder:   Time Spent (min): 45 minutes  Last Date of Nutrition Visit: 06/18/24  Nutrition Follow-Up Needed?: 3-8 days

## 2024-06-19 ENCOUNTER — APPOINTMENT (OUTPATIENT)
Dept: RADIOLOGY | Facility: HOSPITAL | Age: 84
DRG: 536 | End: 2024-06-19
Payer: MEDICARE

## 2024-06-19 LAB
ALBUMIN SERPL BCP-MCNC: 3.6 G/DL (ref 3.4–5)
ALP SERPL-CCNC: 112 U/L (ref 33–136)
ALT SERPL W P-5'-P-CCNC: 11 U/L (ref 10–52)
ANION GAP SERPL CALC-SCNC: 12 MMOL/L (ref 10–20)
AST SERPL W P-5'-P-CCNC: 13 U/L (ref 9–39)
BASOPHILS # BLD AUTO: 0.04 X10*3/UL (ref 0–0.1)
BASOPHILS NFR BLD AUTO: 0.5 %
BILIRUB SERPL-MCNC: 0.5 MG/DL (ref 0–1.2)
BUN SERPL-MCNC: 30 MG/DL (ref 6–23)
CALCIUM SERPL-MCNC: 8.7 MG/DL (ref 8.6–10.3)
CHLORIDE SERPL-SCNC: 107 MMOL/L (ref 98–107)
CO2 SERPL-SCNC: 24 MMOL/L (ref 21–32)
CREAT SERPL-MCNC: 1 MG/DL (ref 0.5–1.3)
EGFRCR SERPLBLD CKD-EPI 2021: 75 ML/MIN/1.73M*2
EOSINOPHIL # BLD AUTO: 0.34 X10*3/UL (ref 0–0.4)
EOSINOPHIL NFR BLD AUTO: 4.6 %
ERYTHROCYTE [DISTWIDTH] IN BLOOD BY AUTOMATED COUNT: 13.4 % (ref 11.5–14.5)
GLUCOSE SERPL-MCNC: 96 MG/DL (ref 74–99)
HCT VFR BLD AUTO: 39.5 % (ref 41–52)
HGB BLD-MCNC: 12.7 G/DL (ref 13.5–17.5)
HOLD SPECIMEN: NORMAL
IMM GRANULOCYTES # BLD AUTO: 0.03 X10*3/UL (ref 0–0.5)
IMM GRANULOCYTES NFR BLD AUTO: 0.4 % (ref 0–0.9)
LYMPHOCYTES # BLD AUTO: 1.41 X10*3/UL (ref 0.8–3)
LYMPHOCYTES NFR BLD AUTO: 18.9 %
MAGNESIUM SERPL-MCNC: 1.95 MG/DL (ref 1.6–2.4)
MCH RBC QN AUTO: 32.5 PG (ref 26–34)
MCHC RBC AUTO-ENTMCNC: 32.2 G/DL (ref 32–36)
MCV RBC AUTO: 101 FL (ref 80–100)
MONOCYTES # BLD AUTO: 0.71 X10*3/UL (ref 0.05–0.8)
MONOCYTES NFR BLD AUTO: 9.5 %
NEUTROPHILS # BLD AUTO: 4.94 X10*3/UL (ref 1.6–5.5)
NEUTROPHILS NFR BLD AUTO: 66.1 %
NRBC BLD-RTO: 0 /100 WBCS (ref 0–0)
PLATELET # BLD AUTO: 179 X10*3/UL (ref 150–450)
POTASSIUM SERPL-SCNC: 4.2 MMOL/L (ref 3.5–5.3)
PROT SERPL-MCNC: 6.4 G/DL (ref 6.4–8.2)
RBC # BLD AUTO: 3.91 X10*6/UL (ref 4.5–5.9)
SODIUM SERPL-SCNC: 139 MMOL/L (ref 136–145)
WBC # BLD AUTO: 7.5 X10*3/UL (ref 4.4–11.3)

## 2024-06-19 PROCEDURE — 36415 COLL VENOUS BLD VENIPUNCTURE: CPT | Performed by: NURSE PRACTITIONER

## 2024-06-19 PROCEDURE — 97116 GAIT TRAINING THERAPY: CPT | Mod: GP,CQ

## 2024-06-19 PROCEDURE — 2500000005 HC RX 250 GENERAL PHARMACY W/O HCPCS: Performed by: STUDENT IN AN ORGANIZED HEALTH CARE EDUCATION/TRAINING PROGRAM

## 2024-06-19 PROCEDURE — 85025 COMPLETE CBC W/AUTO DIFF WBC: CPT | Performed by: NURSE PRACTITIONER

## 2024-06-19 PROCEDURE — 83735 ASSAY OF MAGNESIUM: CPT | Performed by: NURSE PRACTITIONER

## 2024-06-19 PROCEDURE — 80053 COMPREHEN METABOLIC PANEL: CPT | Performed by: NURSE PRACTITIONER

## 2024-06-19 PROCEDURE — 97535 SELF CARE MNGMENT TRAINING: CPT | Mod: GO,CO

## 2024-06-19 PROCEDURE — 97110 THERAPEUTIC EXERCISES: CPT | Mod: GP,CQ

## 2024-06-19 PROCEDURE — G0378 HOSPITAL OBSERVATION PER HR: HCPCS

## 2024-06-19 PROCEDURE — 2500000001 HC RX 250 WO HCPCS SELF ADMINISTERED DRUGS (ALT 637 FOR MEDICARE OP): Performed by: NURSE PRACTITIONER

## 2024-06-19 PROCEDURE — 2500000001 HC RX 250 WO HCPCS SELF ADMINISTERED DRUGS (ALT 637 FOR MEDICARE OP)

## 2024-06-19 PROCEDURE — 2500000002 HC RX 250 W HCPCS SELF ADMINISTERED DRUGS (ALT 637 FOR MEDICARE OP, ALT 636 FOR OP/ED)

## 2024-06-19 PROCEDURE — 74230 X-RAY XM SWLNG FUNCJ C+: CPT | Performed by: RADIOLOGY

## 2024-06-19 PROCEDURE — 92611 MOTION FLUOROSCOPY/SWALLOW: CPT | Mod: GN

## 2024-06-19 PROCEDURE — 92526 ORAL FUNCTION THERAPY: CPT | Mod: GN

## 2024-06-19 PROCEDURE — 74230 X-RAY XM SWLNG FUNCJ C+: CPT

## 2024-06-19 RX ORDER — DOCUSATE SODIUM 100 MG/1
100 CAPSULE, LIQUID FILLED ORAL 2 TIMES DAILY
Status: DISCONTINUED | OUTPATIENT
Start: 2024-06-19 | End: 2024-06-23 | Stop reason: HOSPADM

## 2024-06-19 RX ORDER — AMLODIPINE BESYLATE 5 MG/1
5 TABLET ORAL ONCE
Status: COMPLETED | OUTPATIENT
Start: 2024-06-19 | End: 2024-06-19

## 2024-06-19 RX ORDER — AMLODIPINE BESYLATE 5 MG/1
5 TABLET ORAL DAILY
Status: DISCONTINUED | OUTPATIENT
Start: 2024-06-19 | End: 2024-06-19

## 2024-06-19 RX ORDER — AMLODIPINE BESYLATE 5 MG/1
10 TABLET ORAL DAILY
Status: DISCONTINUED | OUTPATIENT
Start: 2024-06-20 | End: 2024-06-23 | Stop reason: HOSPADM

## 2024-06-19 RX ADMIN — BARIUM SULFATE 100 ML: 0.81 POWDER, FOR SUSPENSION ORAL at 11:59

## 2024-06-19 RX ADMIN — ACETAMINOPHEN 650 MG: 325 TABLET ORAL at 20:27

## 2024-06-19 RX ADMIN — ASPIRIN 81 MG: 81 TABLET, COATED ORAL at 08:22

## 2024-06-19 RX ADMIN — BARIUM SULFATE 20 ML: 400 SUSPENSION ORAL at 11:59

## 2024-06-19 RX ADMIN — ACETAMINOPHEN 650 MG: 325 TABLET ORAL at 15:17

## 2024-06-19 RX ADMIN — FUROSEMIDE 10 MG: 40 TABLET ORAL at 08:22

## 2024-06-19 RX ADMIN — DOCUSATE SODIUM 100 MG: 100 CAPSULE, LIQUID FILLED ORAL at 20:26

## 2024-06-19 RX ADMIN — ACETAMINOPHEN 650 MG: 325 TABLET ORAL at 11:18

## 2024-06-19 RX ADMIN — AMLODIPINE BESYLATE 5 MG: 5 TABLET ORAL at 08:21

## 2024-06-19 RX ADMIN — SIMVASTATIN 20 MG: 20 TABLET, FILM COATED ORAL at 20:26

## 2024-06-19 RX ADMIN — AMLODIPINE BESYLATE 5 MG: 5 TABLET ORAL at 15:32

## 2024-06-19 RX ADMIN — PANTOPRAZOLE SODIUM 40 MG: 40 TABLET, DELAYED RELEASE ORAL at 06:29

## 2024-06-19 RX ADMIN — BARIUM SULFATE 40 ML: 400 PASTE ORAL at 12:00

## 2024-06-19 ASSESSMENT — PAIN - FUNCTIONAL ASSESSMENT
PAIN_FUNCTIONAL_ASSESSMENT: 0-10

## 2024-06-19 ASSESSMENT — COGNITIVE AND FUNCTIONAL STATUS - GENERAL
TURNING FROM BACK TO SIDE WHILE IN FLAT BAD: A LOT
HELP NEEDED FOR BATHING: A LITTLE
STANDING UP FROM CHAIR USING ARMS: A LITTLE
DAILY ACTIVITIY SCORE: 18
WALKING IN HOSPITAL ROOM: A LOT
DRESSING REGULAR LOWER BODY CLOTHING: A LITTLE
MOBILITY SCORE: 11
TOILETING: A LOT
MOBILITY SCORE: 11
MOVING TO AND FROM BED TO CHAIR: A LOT
HELP NEEDED FOR BATHING: A LITTLE
STANDING UP FROM CHAIR USING ARMS: A LOT
WALKING IN HOSPITAL ROOM: A LOT
DAILY ACTIVITIY SCORE: 16
PERSONAL GROOMING: A LITTLE
DRESSING REGULAR UPPER BODY CLOTHING: A LITTLE
MOVING FROM LYING ON BACK TO SITTING ON SIDE OF FLAT BED WITH BEDRAILS: A LOT
TURNING FROM BACK TO SIDE WHILE IN FLAT BAD: A LITTLE
TOILETING: A LOT
CLIMB 3 TO 5 STEPS WITH RAILING: TOTAL
TURNING FROM BACK TO SIDE WHILE IN FLAT BAD: A LOT
MOVING FROM LYING ON BACK TO SITTING ON SIDE OF FLAT BED WITH BEDRAILS: A LITTLE
CLIMB 3 TO 5 STEPS WITH RAILING: TOTAL
STANDING UP FROM CHAIR USING ARMS: A LOT
MOVING TO AND FROM BED TO CHAIR: A LOT
WALKING IN HOSPITAL ROOM: A LOT
DRESSING REGULAR LOWER BODY CLOTHING: A LOT
CLIMB 3 TO 5 STEPS WITH RAILING: TOTAL
DRESSING REGULAR UPPER BODY CLOTHING: A LITTLE
MOBILITY SCORE: 14
PERSONAL GROOMING: A LITTLE
MOVING TO AND FROM BED TO CHAIR: A LOT
MOVING FROM LYING ON BACK TO SITTING ON SIDE OF FLAT BED WITH BEDRAILS: A LOT
EATING MEALS: A LITTLE

## 2024-06-19 ASSESSMENT — PAIN SCALES - GENERAL
PAINLEVEL_OUTOF10: 7
PAINLEVEL_OUTOF10: 0 - NO PAIN
PAINLEVEL_OUTOF10: 4
PAINLEVEL_OUTOF10: 5 - MODERATE PAIN
PAINLEVEL_OUTOF10: 4
PAINLEVEL_OUTOF10: 0 - NO PAIN

## 2024-06-19 ASSESSMENT — PAIN DESCRIPTION - LOCATION
LOCATION: PELVIS
LOCATION: PELVIS

## 2024-06-19 ASSESSMENT — ACTIVITIES OF DAILY LIVING (ADL): HOME_MANAGEMENT_TIME_ENTRY: 14

## 2024-06-19 NOTE — CARE PLAN
The patient's goals for the shift include get some rest    The clinical goals for the shift include patient will rest comfortable throughout the shift      Problem: Fall/Injury  Goal: Not fall by end of shift  Outcome: Progressing  Goal: Be free from injury by end of the shift  Outcome: Progressing  Goal: Verbalize understanding of personal risk factors for fall in the hospital  Outcome: Progressing  Goal: Verbalize understanding of risk factor reduction measures to prevent injury from fall in the home  Outcome: Progressing  Goal: Use assistive devices by end of the shift  Outcome: Progressing  Goal: Pace activities to prevent fatigue by end of the shift  Outcome: Progressing     Problem: Pain  Goal: Takes deep breaths with improved pain control throughout the shift  Outcome: Progressing  Goal: Turns in bed with improved pain control throughout the shift  Outcome: Progressing  Goal: Walks with improved pain control throughout the shift  Outcome: Progressing  Goal: Performs ADL's with improved pain control throughout shift  Outcome: Progressing  Goal: Participates in PT with improved pain control throughout the shift  Outcome: Progressing  Goal: Free from opioid side effects throughout the shift  Outcome: Progressing  Goal: Free from acute confusion related to pain meds throughout the shift  Outcome: Progressing

## 2024-06-19 NOTE — CARE PLAN
The patient's goals for the shift include get some rest    The clinical goals for the shift include monitor vs, labs, I&O's; manage pain; promote safe ambulation; rest    Over the shift, the patient did not make progress toward the following goals. Barriers to progression include \. Recommendations to address these barriers include .

## 2024-06-19 NOTE — PROCEDURES
Modified Barium Swallow Study     Patient Name: Yared Aquino  MRN: [id number]  : 1940  Today's Date: 24  Time Calculation  Start Time: 1200  Stop Time: 1230  Time Calculation (min): 30 min       Modified Barium Swallow Study completed after informed verbal consent. Trials of thin, nectar/mildly thick liquid, puree, regular solids and barium tablet with thin liquid were given.     SPEECH FINDINGS:   Reason for referral: hx of laryngeal CA, dysphagia   Patient hx: falls, Laryngeal CA  Respiratory status: room air    Current diet: Regular/thin     FINAL SPEECH RECOMMENDATIONS  Diet recommendations: BITE SIZED/THIN LIQUIDS- NO STRAWS, SMALL SINGLE SIPS ONLY  Swallow Strategies: - Small bites  - Small, single sips  - Alternate consistencies  -Meds crushed in puree   -Frequent oral care throughout the day   -Monitor for changes in respiratory status.    Pt currently not with PNA, on room air.     Follow up with ENT as needed for laryngeal CA, voice changes     Plan:  Treatment/Interventions: Pharyngeal exercises, Oral motor exercises, Patient/family education, Bolus trials. Diet tolerance/advancement  SLP Plan: Skilled SLP warranted  SLP Frequency: 4x per week  Duration: 30 days     Discussed POC: Patient  Discussed Risks/Benefits: Yes  Patient/Caregiver Agreeable: Yes    Short term goals: established 24   Pt to tolerate SBS/thin liquids without pulmonary compromise   2.  Pt will complete effortful swallows x10/3x daily to strengthen pharyngeal muscles for improved bolus clearance and epiglottic inversion.   3.   Pt. Will complete Angela Maneuver x10/3x daily to improve pharyngeal wall contraction and clearance through the pharynx.   4.  Pt to complete lingual strengthening exercises x10/3x daily  to improve tongue base retraction and bolus clearance through pharynx        Education: SLP provided education to Patient regarding MBSS impressions, recommendations and POC at this time. Verbal  understanding and agreement given on all accounts.   Treatment: Trialed strategies (see below) during MBSS to decrease risk of aspiration. Pt able to follow commands to complete.   Additional consult suggested: N/A     Repeat study/dc plan: Repeat study as clinically indicated         MBSImP Physiological Component  ORAL PHASE:  Lip Closure - Escape from interlabial space or lateral juncture/no extension beyond vermillion border   Tongue Control During Bolus Hold - Cohesive bolus between tongue to palatal seal   Bolus prep/mastication - Slow prolonged mastication with complete re-collection necessary   Bolus transport/lingual motion - Brisk tongue motion for A-P movement of the bolus   Oral residue - Residue collection on oral structure     PHARYNGEAL PHASE:  Initiation of pharyngeal swallow - Bolus head at vallecular pit   Soft palate elevation - No bolus between soft palate/pharyngeal wall   Laryngeal elevation - Partial superior movement of thyroid cartilage and/or partial approximation of arytenoids to epiglottic petiole   Anterior hyoid excursion - Partial anterior movement   Epiglottic movement - Partial inversion  Laryngeal vestibule closure - Incomplete - narrow column of air/contrast in laryngeal vestibule   Pharyngeal stripping wave - Present, however, diminished   Pharyngeal contraction (A/P view) - Complete  Pharyngoesophageal segment opening - Partial distension/partial duration with partial obstruction of flow of bolus   Tongue base retraction - Narrow column of contrast or air between tongue base and pharyngeal wall   Pharyngeal residue - Collection of residue within or on the pharyngeal structures   Laryngeal penetration - Inconsistent during swallow with thin liquids   Aspiration - not present during study but at high risk with larger sips thin liquids   Response to laryngeal penetration/aspiration- No response   Strategies attempted- Chin tuck- not successful  Small single sips- successful    Cough/throat clear- not successful, too weak      ESOPHAGEAL PHASE:  Esophageal clearance - Not evaluated       SLP Impressions with severity rating:   Pt presents with moderate oropharyngeal dysphagia characterized by deficits noted above.     Rosenbek's Penetration Aspiration Scale  Thin Liquids: 2. PENETRATION that CLEARS - contrast enter airway, above vocal cords, no residue - inconsistent, was not present with last trial of small single sips   Thin liquids, larger sips: 5. DEEP PENETRATION with HIGH ASPIRATION risk - contrast contacts vocal cords, visible residue  Puree: 1. NO ASPIRATION & NO PENETRATION - no aspiration, contrast does not enter airway  Soft Solids: 1. NO ASPIRATION & NO PENETRATION - no aspiration, contrast does not enter airway  Solids: 1. NO ASPIRATION & NO PENETRATION - no aspiration, contrast does not enter airway

## 2024-06-19 NOTE — PROGRESS NOTES
"Daily Progress Note    Yared Aquino is a 84 y.o. male on day 0 of admission presenting with Pubic ramus fracture, right, closed, initial encounter (Multi).    Subjective   Patient seen resting comfortably in bed.  Patient had modified barium swallow recommend bite-size/thin liquids, no straws.  Patient denies shortness of breath or chest pain.  Patient seen during disciplinary rounds agreeable to SNF on discharge.  Pending Liliana (wife) to pick a place.       Objective     Physical Exam    Physical Exam  Constitutional:       Appearance: He is cachectic.      Comments: Elderly and frail   HENT:      Head: Normocephalic.      Mouth/Throat:      Mouth: Mucous membranes are moist.   Eyes:      Pupils: Pupils are equal, round, and reactive to light.   Cardiovascular:      Rate and Rhythm: Normal rate and regular rhythm.      Heart sounds: Normal heart sounds, S1 normal and S2 normal.   Pulmonary:      Effort: Pulmonary effort is normal.      Breath sounds: Normal breath sounds.   Abdominal:      General: Bowel sounds are normal.      Palpations: Abdomen is soft.   Musculoskeletal:      Cervical back: Neck supple.      Comments: Decreased ROM to BLE due to pubic ramus fracture   Skin:     General: Skin is warm.   Neurological:      Mental Status: He is alert and oriented to person, place, and time.      Motor: Weakness present.   Psychiatric:         Mood and Affect: Mood normal.         Behavior: Behavior normal.     Last Recorded Vitals  Blood pressure 147/80, pulse 66, temperature 36 °C (96.8 °F), temperature source Temporal, resp. rate 16, height 1.88 m (6' 2.02\"), weight 69.3 kg (152 lb 12.5 oz), SpO2 96%.  Intake/Output last 3 Shifts:  I/O last 3 completed shifts:  In: - (0 mL/kg)   Out: 1550 (22.4 mL/kg) [Urine:1550 (0.6 mL/kg/hr)]  Weight: 69.3 kg     Medications  Scheduled medications  [START ON 6/20/2024] amLODIPine, 10 mg, oral, Daily  amLODIPine, 5 mg, oral, Once  aspirin, 81 mg, oral, Daily  docusate " sodium, 50 mg, oral, BID  furosemide, 10 mg, oral, Daily  pantoprazole, 40 mg, oral, Daily   Or  pantoprazole, 40 mg, intravenous, Daily  polyethylene glycol, 17 g, oral, Nightly  psyllium, 1 packet, oral, Daily  sennosides, 1 tablet, oral, Daily  simvastatin, 20 mg, oral, Nightly      Continuous medications     PRN medications  PRN medications: acetaminophen **OR** acetaminophen **OR** acetaminophen, benzocaine-menthol, diclofenac sodium, melatonin, ondansetron **OR** ondansetron    Labs  CBC:   Results from last 7 days   Lab Units 06/19/24  0703 06/18/24  0534 06/17/24  2106   WBC AUTO x10*3/uL 7.5 6.4 7.5   RBC AUTO x10*6/uL 3.91* 3.63* 4.04*   HEMOGLOBIN g/dL 12.7* 11.9* 13.1*   HEMATOCRIT % 39.5* 36.3* 40.2*   MCV fL 101* 100 100   MCH pg 32.5 32.8 32.4   MCHC g/dL 32.2 32.8 32.6   RDW % 13.4 13.1 13.2   PLATELETS AUTO x10*3/uL 179 181 234     CMP:    Results from last 7 days   Lab Units 06/19/24  0703 06/18/24  0534 06/17/24  2106   SODIUM mmol/L 139 139 139   POTASSIUM mmol/L 4.2 3.8 4.5   CHLORIDE mmol/L 107 109* 106   CO2 mmol/L 24 22 24   BUN mg/dL 30* 35* 39*   CREATININE mg/dL 1.00 1.11 1.30   GLUCOSE mg/dL 96 92 135*   PROTEIN TOTAL g/dL 6.4 6.3* 6.9   CALCIUM mg/dL 8.7 8.7 9.2   BILIRUBIN TOTAL mg/dL 0.5 0.4 0.4   ALK PHOS U/L 112 109 115   AST U/L 13 15 19   ALT U/L 11 11 11     BMP:    Results from last 7 days   Lab Units 06/19/24  0703 06/18/24  0534 06/17/24  2106   SODIUM mmol/L 139 139 139   POTASSIUM mmol/L 4.2 3.8 4.5   CHLORIDE mmol/L 107 109* 106   CO2 mmol/L 24 22 24   BUN mg/dL 30* 35* 39*   CREATININE mg/dL 1.00 1.11 1.30   CALCIUM mg/dL 8.7 8.7 9.2   GLUCOSE mg/dL 96 92 135*     Magnesium:  Results from last 7 days   Lab Units 06/19/24  0703 06/18/24  0534   MAGNESIUM mg/dL 1.95 1.91     Troponin:    Results from last 7 days   Lab Units 06/17/24  2210 06/17/24  2106   TROPHS ng/L 6 8     BNP:     Lipid Panel:         Relevant Results  Results from last 7 days   Lab Units 06/19/24  0703  06/18/24  0534 06/17/24  2106   GLUCOSE mg/dL 96 92 135*     Lab Results   Component Value Date    HGBA1C 6.1 (H) 07/19/2023        Assessment/Plan    Mechanical fall  Acute right pubic ramus fracture  Permanent A-fib SP/Watchman device  Laryngeal cancer  Chronic back pain  Chronic diastolic CHF  HTN/HLD  Failure to thrive in adult  CABG  GERD  Depression  MI at age 51    -Consult Ortho, x-rays negative, weightbearing as tolerated  -Pain meds as needed  -SLP for history of esophageal cancer, recommend MBS  -MBS recommend bite-size/thin liquids, no straws, small single sips only, meds crushed in purée  -Regular diet easy to chew  -CBC BMP daily  -Resume home meds  -Will increase Norvasc 10 mg daily  -PT/OT evaluation  -TCC for discharge planning      DVTp: SCDs    PLAN: SNF to be determined    AYUSH Barajas-CNP    Plan of care was discussed extensively with patient.  Patient verbalized understanding through teach back method.  All question and concerns addressed upon examination.    Of note, this documentation is completed using the Dragon Dictation system (voice recognition software). There may be spelling and/or grammatical errors that were not corrected prior to final submission.

## 2024-06-19 NOTE — PROGRESS NOTES
Physical Therapy    Physical Therapy    Physical Therapy Treatment    Patient Name: Yared Aquino  MRN: [id number]  Today's Date: 6/19/2024  Time Calculation  Start Time: 1340  Stop Time: 1410  Time Calculation (min): 30 min       Assessment/Plan   PT Assessment  PT Assessment Results: Decreased strength, Decreased endurance, Impaired balance, Decreased mobility, Pain  Rehab Prognosis: Good  Evaluation/Treatment Tolerance: Patient limited by fatigue  Medical Staff Made Aware: Yes  Strengths: Support of Caregivers, Living arrangement secure, Housing layout, Ability to acquire knowledge, Access to adaptive/assistive products, Attitude of self  Barriers to Participation: Comorbidities  End of Session Communication: Bedside nurse, PCT/NA/CTA (Nursing aware of no chair alarm is under pt in recliner.)  End of Session Patient Position: Up in chair (Nursing informed no chair alarm  under pt.)     PT Plan  Treatment/Interventions: Bed mobility, Transfer training, Gait training, Balance training, Strengthening, Endurance training, Therapeutic exercise  PT Plan: Ongoing PT  PT Frequency: 4 times per week  PT Discharge Recommendations: Moderate intensity level of continued care  PT Recommended Transfer Status: Assist x2, Assistive device      Current Problem:  1. Fall, initial encounter        2. 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  Family/Caregiver Present: Yes  Caregiver Feedback: Wife is a retired nurse.  Prior to Session Communication: Bedside nurse  Patient Position Received: Bed, 3 rail up  Preferred Learning Style: verbal  General Comment:  (Pt laying in bed in a sitting up position just finishing his lunch.  Wife is present.)  Subjective     Precautions:  Precautions  LE Weight Bearing Status: Weight Bearing as  Tolerated  Medical Precautions: Fall precautions  Precautions Comment: Per EMR: High fall risk         Objective     Pain:  Pain Assessment  Pain Assessment: 0-10  0-10 (Numeric) Pain Score: 5 - Moderate pain  Pain Type: Acute pain  Pain Location: Pelvis                        Treatments:  Therapeutic Exercise  Therapeutic Exercise Performed: Yes  Therapeutic Exercise Activity 1:  (Seated lower extremitiy exercises- AP, LAQ, marching, pillow squeezes x 10 reps- AROM..  V/c to slow down while performing exercises.)        Bed Mobility  Bed Mobility: Yes  Bed Mobility 1  Bed Mobility 1: Supine to sitting  Level of Assistance 1: Maximum assistance  Bed Mobility Comments 1: Pt needed assist to bring hips/ buttocks over to EOB.  Pt able to sit on EOB unassisted with v/c for upright posture and looking up with his head. (Pt transferred onto EOB with max A with v/c for technique.  Pt initially able to initiate bringing LE over to EOB with very small movement.  HOB elevated.  Pt brought up to a sitting position with using pad underneath him.)  Ambulation/Gait Training  Ambulation/Gait Training Performed: Yes  Ambulation/Gait Training 1  Surface 1: Level tile  Device 1: Rolling walker  Assistance 1: Moderate assistance  Quality of Gait 1: Inconsistent stride length  Comments/Distance (ft) 1: R foot tends to slide forward. (Pt ambulates with WW WBAT with very small slow uneven strides with NBOS  with step to gait pattern aleena 10 steps with mod A x2.  Pt demonstrates difficulty advancing RLE due to uncertainity with WB through LLE- weight shifting.)  Transfers  Transfer: Yes  Transfer 1  Technique 1: Sit to stand, Stand to sit  Transfer Device 1: Walker  Transfer Level of Assistance 1: Moderate assistance, +2  Trials/Comments 1:  (Pt transfers with WW from bed with elevating bed with mod A x2 with v/c for technique- posture.  Pt has tendency to plop back into the chair.)  Transfers 2  Technique 2: Sit to stand, Stand to  sit  Transfer Device 2: Walker  Transfer Level of Assistance 2: Moderate assistance  Trials/Comments 2:  (Pt transfers from recliner with WW with mod A x1 with v/c for proper technique- bending at waist.  Pt stood at WW at recliner and worked on WB through B LEs , posture then weight shifting attempting to pick R foot off ground with increased WB  LLE.)          Outcome Measures:  [facility] Basic Mobility  Turning from your back to your side while in a flat bed without using bedrails: A lot  Moving from lying on your back to sitting on the side of a flat bed without using bedrails: A lot  Moving to and from bed to chair (including a wheelchair): A lot  Standing up from a chair using your arms (e.g. wheelchair or bedside chair): A lot  To walk in hospital room: A lot  Climbing 3-5 steps with railing: Total  Basic Mobility - Total Score: 11  Education Documentation  No documentation found.  Education Comments  No comments found.        EDUCATION:    Individual(s) Educated: Patient, Spouse  Education Provided: Body Mechanics, Fall Risk, Home Safety, Home Exercise Program  Encounter Problems       Encounter Problems (Active)       PT Problem       Pt. will transfer supine/sit with SBA (Progressing)       Start:  06/18/24    Expected End:  07/02/24            Pt. will transfer sit/stand with FWW with SBA (Progressing)       Start:  06/18/24    Expected End:  07/02/24            Pt.will ambulate 40' with FWW with CGA (Progressing)       Start:  06/18/24    Expected End:  07/02/24            Pt. will perform 2 x 15 B LE AROM exercises  (Progressing)       Start:  06/18/24    Expected End:  07/02/24

## 2024-06-19 NOTE — PROGRESS NOTES
06/19/24 1454   Discharge Planning   Home or Post Acute Services Post acute facilities (Rehab/SNF/etc)   Type of Post Acute Facility Services Skilled nursing   Patient expects to be discharged to: [facility]   Does the patient need discharge transport arranged? Yes   RoundTrip coordination needed? Yes   Patient Choice   Provider Choice list and CMS website (https://medicare.gov/care-compare#search) for post-acute Quality and Resource Measure Data were provided and reviewed with: Patient;Family   Patient / Family choosing to utilize agency / facility established prior to hospitalization No     [facility] is unable to accept. [facility] accept. PT's spouse prefers [facility] as F[facility]. Facility updated. Precert submitted today. Shannon MENDEZ updated.

## 2024-06-19 NOTE — PROGRESS NOTES
Occupational Therapy    OT Treatment    Patient Name: Yared Aquino  MRN: [id number]  Today's Date: 6/19/2024  Time Calculation  Start Time: 1059  Stop Time: 1113  Time Calculation (min): 14 min         Assessment:  OT Assessment: Patient limited by impaired balance, decreased endurance, decreased strength, and decreased independence with adl's.  End of Session Communication: Bedside nurse, PCT/NA/CTA  End of Session Patient Position: Bed, 2 rail up (Pt leaving with transport team for [facility] study)     Plan:  Treatment Interventions: ADL retraining, Functional transfer training  OT Frequency: 3 times per week  OT Discharge Recommendations: Moderate intensity level of continued care  OT Recommended Transfer Status: Moderate assist (FWW)  Treatment Interventions: ADL retraining, Functional transfer training    Subjective   Previous Visit Info:  OT Last Visit  OT Received On: 06/19/24  General:  General  Prior to Session Communication: Bedside nurse  Patient Position Received: Bed, 3 rail up  General Comment: Pt agreeable to OT, pleasant and cooperative.  Precautions:  LE Weight Bearing Status: Weight Bearing as Tolerated  Medical Precautions: Fall precautions  Precautions Comment: purewick; tele; alarm       Pain:  Pain Assessment  Pain Assessment: 0-10  0-10 (Numeric) Pain Score: 7  Pain Type: Acute pain  Pain Location: Pelvis    Objective    Cognition:  Cognition  Orientation Level: Oriented X4       Activities of Daily Living: LE Dressing  LE Dressing: Yes  LE Dressing Adaptive Equipment: Reacher  Pants Level of Assistance: Maximum assistance  LE Dressing Where Assessed: Edge of bed  LE Dressing Comments: Assist to thread LE's with intermittent use of reacher. Education provided on use of reacher with fair carryover.  Pt limited by pain.  Assist to boost and steady, assist to pull up clothing.     Bed Mobility/Transfers: Bed Mobility  Bed Mobility: Yes  Bed Mobility 1  Bed Mobility 1: Supine to sitting, Sitting to  supine  Level of Assistance 1: Maximum assistance, +2  Bed Mobility Comments 1: Assist to advance LE's and scoot buttocks closer to left side of bed.  Assist to elevate trunk to upright sitting position.  Pt able to scoot forward to allow feet to touch floor. **sit to supine transfer with max assist x 2.  Assist to bring LE's onto bed and lower trunk; assist to boost to top of bed.    Transfers  Transfer: Yes  Transfer 1  Technique 1: Sit to stand, Stand to sit  Transfer Device 1:  (fww)  Transfer Level of Assistance 1: Moderate assistance  Trials/Comments 1: retropulsive, cues and assist to correct balance posture.  Assist to boost and steady.  Gait belt used for safety (x2 trials)      Therapy/Activity: Balance/Neuromuscular Re-Education  Balance/Neuromuscular Re-Education Activity Performed:  (Static stand x 1 min; dyn stand with lateral weight shifting and side stepping towards HOB x 1 min.  Pt demo'd P+ balance with standing trials.)      Outcome Measures:[facility] Daily Activity  Putting on and taking off regular lower body clothing: A lot  Bathing (including washing, rinsing, drying): A little  Putting on and taking off regular upper body clothing: A little  Toileting, which includes using toilet, bedpan or urinal: A lot  Taking care of personal grooming such as brushing teeth: A little  Eating Meals: A little  Daily Activity - Total Score: 16        Education Documentation  ADL Training, taught by Jennifer L Felty, OTA at 6/19/2024  4:04 PM.  Learner: Patient  Readiness: Acceptance  Method: Explanation, Demonstration  Response: Needs Reinforcement        Goals:  Encounter Problems       Encounter Problems (Active)       OT Goals       Pt will complete LB dressing with [place] for use of AE.   (Progressing)       Start:  06/18/24    Expected End:  07/02/24            Patient will transfer to bed/chair/toilet with SBA and use of FWW. (Progressing)       Start:  06/18/24    Expected End:  07/02/24            Pt will  ambulate functional household distance with [place] of use of FWW to complete I/ADL task.   (Progressing)       Start:  06/18/24    Expected End:  07/02/24

## 2024-06-20 LAB
ALBUMIN SERPL BCP-MCNC: 3.5 G/DL (ref 3.4–5)
ALP SERPL-CCNC: 107 U/L (ref 33–136)
ALT SERPL W P-5'-P-CCNC: 9 U/L (ref 10–52)
ANION GAP SERPL CALC-SCNC: 11 MMOL/L (ref 10–20)
AST SERPL W P-5'-P-CCNC: 12 U/L (ref 9–39)
ATRIAL RATE: 535 BPM
ATRIAL RATE: 68 BPM
BASOPHILS # BLD AUTO: 0.05 X10*3/UL (ref 0–0.1)
BASOPHILS NFR BLD AUTO: 0.8 %
BILIRUB SERPL-MCNC: 0.5 MG/DL (ref 0–1.2)
BUN SERPL-MCNC: 32 MG/DL (ref 6–23)
CALCIUM SERPL-MCNC: 8.6 MG/DL (ref 8.6–10.3)
CHLORIDE SERPL-SCNC: 106 MMOL/L (ref 98–107)
CO2 SERPL-SCNC: 25 MMOL/L (ref 21–32)
CREAT SERPL-MCNC: 0.98 MG/DL (ref 0.5–1.3)
EGFRCR SERPLBLD CKD-EPI 2021: 77 ML/MIN/1.73M*2
EOSINOPHIL # BLD AUTO: 0.32 X10*3/UL (ref 0–0.4)
EOSINOPHIL NFR BLD AUTO: 5 %
ERYTHROCYTE [DISTWIDTH] IN BLOOD BY AUTOMATED COUNT: 13.3 % (ref 11.5–14.5)
GLUCOSE SERPL-MCNC: 97 MG/DL (ref 74–99)
HCT VFR BLD AUTO: 37.4 % (ref 41–52)
HGB BLD-MCNC: 12.1 G/DL (ref 13.5–17.5)
IMM GRANULOCYTES # BLD AUTO: 0.03 X10*3/UL (ref 0–0.5)
IMM GRANULOCYTES NFR BLD AUTO: 0.5 % (ref 0–0.9)
LYMPHOCYTES # BLD AUTO: 1.26 X10*3/UL (ref 0.8–3)
LYMPHOCYTES NFR BLD AUTO: 19.6 %
MAGNESIUM SERPL-MCNC: 1.98 MG/DL (ref 1.6–2.4)
MCH RBC QN AUTO: 32.6 PG (ref 26–34)
MCHC RBC AUTO-ENTMCNC: 32.4 G/DL (ref 32–36)
MCV RBC AUTO: 101 FL (ref 80–100)
MONOCYTES # BLD AUTO: 0.71 X10*3/UL (ref 0.05–0.8)
MONOCYTES NFR BLD AUTO: 11 %
NEUTROPHILS # BLD AUTO: 4.07 X10*3/UL (ref 1.6–5.5)
NEUTROPHILS NFR BLD AUTO: 63.1 %
NRBC BLD-RTO: 0 /100 WBCS (ref 0–0)
PLATELET # BLD AUTO: 166 X10*3/UL (ref 150–450)
POTASSIUM SERPL-SCNC: 4.1 MMOL/L (ref 3.5–5.3)
PROT SERPL-MCNC: 6.5 G/DL (ref 6.4–8.2)
Q ONSET: 207 MS
Q ONSET: 208 MS
QRS COUNT: 10 BEATS
QRS COUNT: 12 BEATS
QRS DURATION: 96 MS
QRS DURATION: 98 MS
QT INTERVAL: 420 MS
QT INTERVAL: 432 MS
QTC CALCULATION(BAZETT): 432 MS
QTC CALCULATION(BAZETT): 456 MS
QTC FREDERICIA: 432 MS
QTC FREDERICIA: 444 MS
R AXIS: -53 DEGREES
R AXIS: -57 DEGREES
RBC # BLD AUTO: 3.71 X10*6/UL (ref 4.5–5.9)
SODIUM SERPL-SCNC: 138 MMOL/L (ref 136–145)
T AXIS: 67 DEGREES
T AXIS: 89 DEGREES
T OFFSET: 418 MS
T OFFSET: 423 MS
VENTRICULAR RATE: 60 BPM
VENTRICULAR RATE: 71 BPM
WBC # BLD AUTO: 6.4 X10*3/UL (ref 4.4–11.3)

## 2024-06-20 PROCEDURE — 2500000001 HC RX 250 WO HCPCS SELF ADMINISTERED DRUGS (ALT 637 FOR MEDICARE OP): Performed by: NURSE PRACTITIONER

## 2024-06-20 PROCEDURE — 2500000001 HC RX 250 WO HCPCS SELF ADMINISTERED DRUGS (ALT 637 FOR MEDICARE OP)

## 2024-06-20 PROCEDURE — 85025 COMPLETE CBC W/AUTO DIFF WBC: CPT | Performed by: STUDENT IN AN ORGANIZED HEALTH CARE EDUCATION/TRAINING PROGRAM

## 2024-06-20 PROCEDURE — 97116 GAIT TRAINING THERAPY: CPT | Mod: GP,CQ

## 2024-06-20 PROCEDURE — 80053 COMPREHEN METABOLIC PANEL: CPT | Performed by: STUDENT IN AN ORGANIZED HEALTH CARE EDUCATION/TRAINING PROGRAM

## 2024-06-20 PROCEDURE — 97530 THERAPEUTIC ACTIVITIES: CPT | Mod: GP,CQ

## 2024-06-20 PROCEDURE — 97110 THERAPEUTIC EXERCISES: CPT | Mod: GP,CQ

## 2024-06-20 PROCEDURE — 83735 ASSAY OF MAGNESIUM: CPT | Performed by: STUDENT IN AN ORGANIZED HEALTH CARE EDUCATION/TRAINING PROGRAM

## 2024-06-20 PROCEDURE — 96372 THER/PROPH/DIAG INJ SC/IM: CPT

## 2024-06-20 PROCEDURE — 36415 COLL VENOUS BLD VENIPUNCTURE: CPT | Performed by: STUDENT IN AN ORGANIZED HEALTH CARE EDUCATION/TRAINING PROGRAM

## 2024-06-20 PROCEDURE — 2500000004 HC RX 250 GENERAL PHARMACY W/ HCPCS (ALT 636 FOR OP/ED)

## 2024-06-20 PROCEDURE — G0378 HOSPITAL OBSERVATION PER HR: HCPCS

## 2024-06-20 PROCEDURE — 2500000002 HC RX 250 W HCPCS SELF ADMINISTERED DRUGS (ALT 637 FOR MEDICARE OP, ALT 636 FOR OP/ED)

## 2024-06-20 RX ORDER — AMLODIPINE BESYLATE 10 MG/1
10 TABLET ORAL DAILY
Qty: 30 TABLET | Refills: 0 | Status: SHIPPED | OUTPATIENT
Start: 2024-06-21 | End: 2024-06-23

## 2024-06-20 RX ORDER — SODIUM CHLORIDE 9 MG/ML
75 INJECTION, SOLUTION INTRAVENOUS CONTINUOUS
Status: DISCONTINUED | OUTPATIENT
Start: 2024-06-20 | End: 2024-06-21

## 2024-06-20 RX ORDER — GUAIFENESIN 600 MG/1
600 TABLET, EXTENDED RELEASE ORAL 2 TIMES DAILY
Status: DISCONTINUED | OUTPATIENT
Start: 2024-06-20 | End: 2024-06-23 | Stop reason: HOSPADM

## 2024-06-20 RX ORDER — ENOXAPARIN SODIUM 100 MG/ML
40 INJECTION SUBCUTANEOUS EVERY 24 HOURS
Status: DISCONTINUED | OUTPATIENT
Start: 2024-06-20 | End: 2024-06-23 | Stop reason: HOSPADM

## 2024-06-20 RX ADMIN — Medication 3 MG: at 20:04

## 2024-06-20 RX ADMIN — ENOXAPARIN SODIUM 40 MG: 40 INJECTION SUBCUTANEOUS at 12:44

## 2024-06-20 RX ADMIN — ACETAMINOPHEN 650 MG: 325 TABLET ORAL at 20:03

## 2024-06-20 RX ADMIN — FUROSEMIDE 10 MG: 40 TABLET ORAL at 08:13

## 2024-06-20 RX ADMIN — GUAIFENESIN 600 MG: 600 TABLET, EXTENDED RELEASE ORAL at 10:41

## 2024-06-20 RX ADMIN — PANTOPRAZOLE SODIUM 40 MG: 40 TABLET, DELAYED RELEASE ORAL at 06:25

## 2024-06-20 RX ADMIN — ASPIRIN 81 MG: 81 TABLET, COATED ORAL at 08:13

## 2024-06-20 RX ADMIN — SODIUM CHLORIDE 75 ML/HR: 9 INJECTION, SOLUTION INTRAVENOUS at 12:44

## 2024-06-20 RX ADMIN — GUAIFENESIN 600 MG: 600 TABLET, EXTENDED RELEASE ORAL at 20:04

## 2024-06-20 RX ADMIN — AMLODIPINE BESYLATE 10 MG: 5 TABLET ORAL at 08:17

## 2024-06-20 RX ADMIN — SIMVASTATIN 20 MG: 20 TABLET, FILM COATED ORAL at 20:04

## 2024-06-20 ASSESSMENT — COGNITIVE AND FUNCTIONAL STATUS - GENERAL
HELP NEEDED FOR BATHING: TOTAL
MOBILITY SCORE: 12
DRESSING REGULAR LOWER BODY CLOTHING: TOTAL
MOBILITY SCORE: 11
DAILY ACTIVITIY SCORE: 11
WALKING IN HOSPITAL ROOM: A LOT
DRESSING REGULAR UPPER BODY CLOTHING: A LOT
TURNING FROM BACK TO SIDE WHILE IN FLAT BAD: A LOT
MOVING TO AND FROM BED TO CHAIR: A LOT
MOBILITY SCORE: 11
HELP NEEDED FOR BATHING: A LOT
MOVING TO AND FROM BED TO CHAIR: A LOT
MOVING FROM LYING ON BACK TO SITTING ON SIDE OF FLAT BED WITH BEDRAILS: A LOT
EATING MEALS: A LITTLE
WALKING IN HOSPITAL ROOM: A LOT
PERSONAL GROOMING: A LOT
WALKING IN HOSPITAL ROOM: A LOT
STANDING UP FROM CHAIR USING ARMS: A LOT
TURNING FROM BACK TO SIDE WHILE IN FLAT BAD: A LOT
DRESSING REGULAR UPPER BODY CLOTHING: A LOT
PERSONAL GROOMING: A LOT
DAILY ACTIVITIY SCORE: 13
CLIMB 3 TO 5 STEPS WITH RAILING: TOTAL
CLIMB 3 TO 5 STEPS WITH RAILING: TOTAL
MOVING FROM LYING ON BACK TO SITTING ON SIDE OF FLAT BED WITH BEDRAILS: A LITTLE
DRESSING REGULAR LOWER BODY CLOTHING: A LOT
MOVING TO AND FROM BED TO CHAIR: A LOT
TOILETING: A LOT
TOILETING: A LOT
STANDING UP FROM CHAIR USING ARMS: A LOT
TURNING FROM BACK TO SIDE WHILE IN FLAT BAD: A LOT
MOVING FROM LYING ON BACK TO SITTING ON SIDE OF FLAT BED WITH BEDRAILS: A LOT
STANDING UP FROM CHAIR USING ARMS: A LOT
CLIMB 3 TO 5 STEPS WITH RAILING: TOTAL
EATING MEALS: A LITTLE

## 2024-06-20 ASSESSMENT — PAIN SCALES - GENERAL
PAINLEVEL_OUTOF10: 3
PAINLEVEL_OUTOF10: 2
PAINLEVEL_OUTOF10: 0 - NO PAIN

## 2024-06-20 ASSESSMENT — PAIN - FUNCTIONAL ASSESSMENT
PAIN_FUNCTIONAL_ASSESSMENT: 0-10
PAIN_FUNCTIONAL_ASSESSMENT: 0-10

## 2024-06-20 ASSESSMENT — PAIN DESCRIPTION - DESCRIPTORS: DESCRIPTORS: ACHING;DISCOMFORT;DULL;SORE

## 2024-06-20 ASSESSMENT — PAIN DESCRIPTION - LOCATION: LOCATION: PELVIS

## 2024-06-20 NOTE — PROGRESS NOTES
Physical Therapy    Physical Therapy    Physical Therapy Treatment    Patient Name: Yared Aquino  MRN: [id number]  Today's Date: 6/20/2024  Time Calculation  Start Time: 1117  Stop Time: 1155  Time Calculation (min): 38 min       Assessment/Plan   PT Assessment  PT Assessment Results: Decreased strength, Decreased endurance, Impaired balance, Decreased mobility, Pain  Rehab Prognosis: Good  Evaluation/Treatment Tolerance: Patient limited by fatigue  Medical Staff Made Aware: Yes  Strengths: Support of Caregivers, Living arrangement secure, Housing layout, Ability to acquire knowledge, Access to adaptive/assistive products, Attitude of self  Barriers to Participation: Comorbidities  End of Session Communication: PCT/NA/CTA  Assessment Comment:  (Pt progressing toward goals- fearful of falling hinders progression.)  End of Session Patient Position: Alarm on, Up in chair     PT Plan  Treatment/Interventions: Bed mobility, Transfer training, Gait training, Balance training, Strengthening, Endurance training, Therapeutic exercise  PT Plan: Ongoing PT  PT Frequency: 4 times per week  PT Discharge Recommendations: Moderate intensity level of continued care  PT Recommended Transfer Status: Assist x2, Assistive device      Current Problem:  1. Fall, initial encounter        2. Closed fracture of ramus of right pubis, initial encounter (Multi)        3. 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 Gastroesophageal reflux disease without esophagitis  amLODIPine (Norvasc) 10 mg tablet    benzocaine-menthol (Cepastat Sore Throat) lozenge          General Visit Information:   PT  Visit  PT Received On: 06/20/24  General  Reason for Referral: Pt fell- rami pubis fx.  Family/Caregiver Present: No  Caregiver Feedback: Wife is a retired nurse.  Prior to Session Communication: Bedside nurse  Patient Position Received: Bed, 3 rail up  Preferred Learning Style: verbal  General Comment:  (Pt states I was just put on the  bedpan.)  Subjective     Precautions:  Precautions  LE Weight Bearing Status: Weight Bearing as Tolerated  Medical Precautions: Fall precautions  Precautions Comment: Per EMR: High fall risk         Objective     Pain:  Pain Assessment  Pain Assessment: 0-10  0-10 (Numeric) Pain Score: 2  Pain Type: Acute pain  Pain Location: Pelvis                     Treatments:  Therapeutic Exercise  Therapeutic Exercise Performed: Yes  Therapeutic Exercise Activity 1: APs, HS,  and SLR B LEs x 10 reps. (Pt performed supine exercises to assit with loosening up joints and stretching muscles out.)  Therapeutic Exercise Activity 2:  (Seated exercises on EOC  AP, LAQ, marching, pillow squeezes x 10 reps- AROM.  B LEs)  Therapeutic Exercise Activity 3:  (Longsitting exercises in recliner- AP, QS, GS, HS, SAQ, SLR and Abd/Add x 10 reps.)        Bed Mobility  Bed Mobility: Yes  Bed Mobility 1  Bed Mobility 1: Supine to sitting  Level of Assistance 1: Moderate assistance  Bed Mobility Comments 1:  (Pt transfers onto EOB with pt being able to bring LEs over to EOB with small, slow movements.  Pt require mod A x1 to bring upper trunk forward/ upward into a sitting position.  Pt also required assist to bring R hip to EOB using bed pad.)  Bed Mobility 2  Bed Mobility  2: Rolling left  Level of Assistance 2: Minimum assistance  Bed Mobility Comments 2:  (Pt logged rolled to the left with min A with v/c for hand placement using side rail and bringing R leg on top of LLE.)  Ambulation/Gait Training  Ambulation/Gait Training Performed: Yes  Ambulation/Gait Training 1  Surface 1: Level tile  Device 1: Rolling walker  Assistance 1: Moderate assistance (Pt required mod A x2 due to R knee yannick as L foot is advanced forward due to pt not holding quad tight.)  Quality of Gait 1: Inconsistent stride length, Decreased step length, Knee(s) buckle  Comments/Distance (ft) 1:  (Pt stood at bedside with WW with mod A x2 with v/c for upright posture .   Took several small ineven steps with v/c fo sequence/ technique with mod A to advanced walker.  Pt fearful of falling.  Require v/c to tighten R quad when stepping with L foot.)  Transfers  Transfer: Yes  Transfer 1  Technique 1: Sit to stand, Stand to sit  Transfer Device 1: Walker  Transfer Level of Assistance 1: Moderate verbal cues, +2  Trials/Comments 1:  (Pt transfers from bedside with WW with WBAT with mod A x2 with v/c for proper technique.  Pt stood at bedside initially to gain balance with increased WB through LEs.)  Transfers 2  Technique 2: Sit to stand, Stand to sit  Transfer Device 2: Walker  Transfer Level of Assistance 2: Moderate assistance  Trials/Comments 2:  (Pt transferred from recliner with WW with mos A x1 with v/c .  Pt stood at walker and focused on upright posture.  Able to initiate weight shifting then able to march with each leg.  Fatigues more quickly with bearing body weight through RLE.)          Outcome Measures:  [facility] Basic Mobility  Turning from your back to your side while in a flat bed without using bedrails: A little  Moving from lying on your back to sitting on the side of a flat bed without using bedrails: A lot  Moving to and from bed to chair (including a wheelchair): A lot  Standing up from a chair using your arms (e.g. wheelchair or bedside chair): A lot  To walk in hospital room: A lot  Climbing 3-5 steps with railing: Total  Basic Mobility - Total Score: 12  Education Documentation  No documentation found.  Education Comments  No comments found.        EDUCATION:    Individual(s) Educated: Patient  Education Provided: Body Mechanics, Fall Risk, Home Exercise Program, Home Safety  Encounter Problems       Encounter Problems (Active)       PT Problem       Pt. will transfer supine/sit with SBA (Progressing)       Start:  06/18/24    Expected End:  07/02/24            Pt. will transfer sit/stand with FWW with SBA (Progressing)       Start:  06/18/24    Expected End:   07/02/24            Pt.will ambulate 40' with FWW with CGA (Progressing)       Start:  06/18/24    Expected End:  07/02/24            Pt. will perform 2 x 15 B LE AROM exercises  (Progressing)       Start:  06/18/24    Expected End:  07/02/24

## 2024-06-20 NOTE — CARE PLAN
Problem: Fall/Injury  Goal: Not fall by end of shift  Outcome: Progressing  Goal: Be free from injury by end of the shift  Outcome: Progressing  Goal: Verbalize understanding of personal risk factors for fall in the hospital  Outcome: Progressing  Goal: Verbalize understanding of risk factor reduction measures to prevent injury from fall in the home  Outcome: Progressing  Goal: Use assistive devices by end of the shift  Outcome: Progressing  Goal: Pace activities to prevent fatigue by end of the shift  Outcome: Progressing     Problem: Pain  Goal: Takes deep breaths with improved pain control throughout the shift  Outcome: Progressing  Goal: Turns in bed with improved pain control throughout the shift  Outcome: Progressing  Goal: Walks with improved pain control throughout the shift  Outcome: Progressing  Goal: Performs ADL's with improved pain control throughout shift  Outcome: Progressing  Goal: Participates in PT with improved pain control throughout the shift  Outcome: Progressing  Goal: Free from opioid side effects throughout the shift  Outcome: Progressing  Goal: Free from acute confusion related to pain meds throughout the shift  Outcome: Progressing   The patient's goals for the shift include get some rest    The clinical goals for the shift include Patient's pain will be tolerable throughout shift.

## 2024-06-20 NOTE — PROGRESS NOTES
"Daily Progress Note    Yared Aquino is a 84 y.o. male on day 0 of admission presenting with Pubic ramus fracture, right, closed, initial encounter (Multi).    Subjective   Patient seen sitting up in chair today.  Patient much more awake and alert.  Patient denies feeling short of breath or chest pain.  Plan for discharge on meals.       Objective     Physical Exam    Physical Exam  Constitutional:       Appearance: He is cachectic.      Comments: Elderly and frail   HENT:      Head: Normocephalic.      Mouth/Throat:      Mouth: Mucous membranes are moist.   Eyes:      Pupils: Pupils are equal, round, and reactive to light.   Cardiovascular:      Rate and Rhythm: Normal rate and regular rhythm.      Heart sounds: Normal heart sounds, S1 normal and S2 normal.   Pulmonary:      Effort: Pulmonary effort is normal.      Breath sounds: Normal breath sounds.   Abdominal:      General: Bowel sounds are normal.      Palpations: Abdomen is soft.   Musculoskeletal:      Cervical back: Neck supple.      Comments: Decreased ROM to BLE due to pubic ramus fracture   Skin:     General: Skin is warm.   Neurological:      Mental Status: He is alert and oriented to person, place, and time.      Motor: Weakness present.   Psychiatric:         Mood and Affect: Mood normal.         Behavior: Behavior normal.     Last Recorded Vitals  Blood pressure 147/68, pulse 81, temperature 36.3 °C (97.3 °F), temperature source Temporal, resp. rate 16, height 1.88 m (6' 2.02\"), weight 69.3 kg (152 lb 12.5 oz), SpO2 95%.  Intake/Output last 3 Shifts:  I/O last 3 completed shifts:  In: - (0 mL/kg)   Out: 2650 (38.2 mL/kg) [Urine:2650 (1.1 mL/kg/hr)]  Weight: 69.3 kg     Medications  Scheduled medications  amLODIPine, 10 mg, oral, Daily  aspirin, 81 mg, oral, Daily  docusate sodium, 100 mg, oral, BID  furosemide, 10 mg, oral, Daily  guaiFENesin, 600 mg, oral, BID  pantoprazole, 40 mg, oral, Daily   Or  pantoprazole, 40 mg, intravenous, " Daily  polyethylene glycol, 17 g, oral, Nightly  psyllium, 1 packet, oral, Daily  sennosides, 1 tablet, oral, Daily  simvastatin, 20 mg, oral, Nightly      Continuous medications  sodium chloride 0.9%, 75 mL/hr      PRN medications  PRN medications: acetaminophen **OR** acetaminophen **OR** acetaminophen, benzocaine-menthol, diclofenac sodium, melatonin, ondansetron **OR** ondansetron    Labs  CBC:   Results from last 7 days   Lab Units 06/20/24  0544 06/19/24  0703 06/18/24  0534   WBC AUTO x10*3/uL 6.4 7.5 6.4   RBC AUTO x10*6/uL 3.71* 3.91* 3.63*   HEMOGLOBIN g/dL 12.1* 12.7* 11.9*   HEMATOCRIT % 37.4* 39.5* 36.3*   MCV fL 101* 101* 100   MCH pg 32.6 32.5 32.8   MCHC g/dL 32.4 32.2 32.8   RDW % 13.3 13.4 13.1   PLATELETS AUTO x10*3/uL 166 179 181     CMP:    Results from last 7 days   Lab Units 06/20/24  0544 06/19/24  0703 06/18/24  0534   SODIUM mmol/L 138 139 139   POTASSIUM mmol/L 4.1 4.2 3.8   CHLORIDE mmol/L 106 107 109*   CO2 mmol/L 25 24 22   BUN mg/dL 32* 30* 35*   CREATININE mg/dL 0.98 1.00 1.11   GLUCOSE mg/dL 97 96 92   PROTEIN TOTAL g/dL 6.5 6.4 6.3*   CALCIUM mg/dL 8.6 8.7 8.7   BILIRUBIN TOTAL mg/dL 0.5 0.5 0.4   ALK PHOS U/L 107 112 109   AST U/L 12 13 15   ALT U/L 9* 11 11     BMP:    Results from last 7 days   Lab Units 06/20/24  0544 06/19/24  0703 06/18/24  0534   SODIUM mmol/L 138 139 139   POTASSIUM mmol/L 4.1 4.2 3.8   CHLORIDE mmol/L 106 107 109*   CO2 mmol/L 25 24 22   BUN mg/dL 32* 30* 35*   CREATININE mg/dL 0.98 1.00 1.11   CALCIUM mg/dL 8.6 8.7 8.7   GLUCOSE mg/dL 97 96 92     Magnesium:  Results from last 7 days   Lab Units 06/20/24  0544 06/19/24  0703 06/18/24  0534   MAGNESIUM mg/dL 1.98 1.95 1.91     Troponin:    Results from last 7 days   Lab Units 06/17/24  2210 06/17/24  2106   TROPHS ng/L 6 8     BNP:     Lipid Panel:         Relevant Results  Results from last 7 days   Lab Units 06/20/24  0544 06/19/24  0703 06/18/24  0534 06/17/24  2106   GLUCOSE mg/dL 97 96 92 135*     Lab  Results   Component Value Date    HGBA1C 6.1 (H) 07/19/2023        Assessment/Plan    Mechanical fall  Acute right pubic ramus fracture  Permanent A-fib SP/Watchman device  Laryngeal cancer  Chronic back pain  Chronic diastolic CHF  HTN/HLD  Failure to thrive in adult  CABG  GERD  Depression  MI at age 51    -Consult Ortho, x-rays negative, weightbearing as tolerated  -Pain meds as needed  -SLP for history of esophageal cancer, recommend MBS  -MBS recommend bite-size/thin liquids, no straws, small single sips only, meds crushed in purée  -Regular diet easy to chew  -CBC BMP daily  -Resume home meds  -Will increase Norvasc 10 mg daily  -PT/OT evaluation  -TCC for discharge planning    DVTp: Lovenox subcu    PLAN: SNF    AYUSH Barajas-CNP    Plan of care was discussed extensively with patient.  Patient verbalized understanding through teach back method.  All question and concerns addressed upon examination.    Of note, this documentation is completed using the Dragon Dictation system (voice recognition software). There may be spelling and/or grammatical errors that were not corrected prior to final submission.

## 2024-06-20 NOTE — PROGRESS NOTES
Occupational Therapy                 Therapy Communication Note    Patient Name: Yared Aquino  MRN: [id number]  Today's Date: 6/20/2024     Discipline: Occupational Therapy    Missed Visit Reason: Missed Visit Reason:  (Pt working with PT (11:18) then lunch arrived.  Will reattempt as schedule permits.)    Missed Time: Attempt x2
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
## 2024-06-21 LAB
ALBUMIN SERPL BCP-MCNC: 3.2 G/DL (ref 3.4–5)
ALP SERPL-CCNC: 99 U/L (ref 33–136)
ALT SERPL W P-5'-P-CCNC: 7 U/L (ref 10–52)
ANION GAP SERPL CALC-SCNC: 10 MMOL/L (ref 10–20)
AST SERPL W P-5'-P-CCNC: 10 U/L (ref 9–39)
BASOPHILS # BLD AUTO: 0.06 X10*3/UL (ref 0–0.1)
BASOPHILS NFR BLD AUTO: 1 %
BILIRUB SERPL-MCNC: 0.4 MG/DL (ref 0–1.2)
BUN SERPL-MCNC: 35 MG/DL (ref 6–23)
CALCIUM SERPL-MCNC: 8.5 MG/DL (ref 8.6–10.3)
CHLORIDE SERPL-SCNC: 107 MMOL/L (ref 98–107)
CO2 SERPL-SCNC: 24 MMOL/L (ref 21–32)
CREAT SERPL-MCNC: 0.97 MG/DL (ref 0.5–1.3)
EGFRCR SERPLBLD CKD-EPI 2021: 77 ML/MIN/1.73M*2
EOSINOPHIL # BLD AUTO: 0.29 X10*3/UL (ref 0–0.4)
EOSINOPHIL NFR BLD AUTO: 4.9 %
ERYTHROCYTE [DISTWIDTH] IN BLOOD BY AUTOMATED COUNT: 13.2 % (ref 11.5–14.5)
GLUCOSE SERPL-MCNC: 96 MG/DL (ref 74–99)
HCT VFR BLD AUTO: 36.7 % (ref 41–52)
HGB BLD-MCNC: 12 G/DL (ref 13.5–17.5)
HOLD SPECIMEN: NORMAL
IMM GRANULOCYTES # BLD AUTO: 0.04 X10*3/UL (ref 0–0.5)
IMM GRANULOCYTES NFR BLD AUTO: 0.7 % (ref 0–0.9)
LYMPHOCYTES # BLD AUTO: 1.43 X10*3/UL (ref 0.8–3)
LYMPHOCYTES NFR BLD AUTO: 24.2 %
MAGNESIUM SERPL-MCNC: 1.98 MG/DL (ref 1.6–2.4)
MCH RBC QN AUTO: 32.6 PG (ref 26–34)
MCHC RBC AUTO-ENTMCNC: 32.7 G/DL (ref 32–36)
MCV RBC AUTO: 100 FL (ref 80–100)
MONOCYTES # BLD AUTO: 0.63 X10*3/UL (ref 0.05–0.8)
MONOCYTES NFR BLD AUTO: 10.7 %
NEUTROPHILS # BLD AUTO: 3.46 X10*3/UL (ref 1.6–5.5)
NEUTROPHILS NFR BLD AUTO: 58.5 %
NRBC BLD-RTO: 0 /100 WBCS (ref 0–0)
PLATELET # BLD AUTO: 169 X10*3/UL (ref 150–450)
POTASSIUM SERPL-SCNC: 4.2 MMOL/L (ref 3.5–5.3)
PROT SERPL-MCNC: 6.1 G/DL (ref 6.4–8.2)
RBC # BLD AUTO: 3.68 X10*6/UL (ref 4.5–5.9)
SODIUM SERPL-SCNC: 137 MMOL/L (ref 136–145)
WBC # BLD AUTO: 5.9 X10*3/UL (ref 4.4–11.3)

## 2024-06-21 PROCEDURE — 36415 COLL VENOUS BLD VENIPUNCTURE: CPT | Performed by: STUDENT IN AN ORGANIZED HEALTH CARE EDUCATION/TRAINING PROGRAM

## 2024-06-21 PROCEDURE — 80053 COMPREHEN METABOLIC PANEL: CPT | Performed by: STUDENT IN AN ORGANIZED HEALTH CARE EDUCATION/TRAINING PROGRAM

## 2024-06-21 PROCEDURE — 83735 ASSAY OF MAGNESIUM: CPT | Performed by: STUDENT IN AN ORGANIZED HEALTH CARE EDUCATION/TRAINING PROGRAM

## 2024-06-21 PROCEDURE — 85025 COMPLETE CBC W/AUTO DIFF WBC: CPT | Performed by: STUDENT IN AN ORGANIZED HEALTH CARE EDUCATION/TRAINING PROGRAM

## 2024-06-21 PROCEDURE — 97530 THERAPEUTIC ACTIVITIES: CPT | Mod: GP,CQ

## 2024-06-21 PROCEDURE — 2500000002 HC RX 250 W HCPCS SELF ADMINISTERED DRUGS (ALT 637 FOR MEDICARE OP, ALT 636 FOR OP/ED)

## 2024-06-21 PROCEDURE — C9113 INJ PANTOPRAZOLE SODIUM, VIA: HCPCS | Performed by: NURSE PRACTITIONER

## 2024-06-21 PROCEDURE — 99232 SBSQ HOSP IP/OBS MODERATE 35: CPT | Performed by: STUDENT IN AN ORGANIZED HEALTH CARE EDUCATION/TRAINING PROGRAM

## 2024-06-21 PROCEDURE — 1210000001 HC SEMI-PRIVATE ROOM DAILY

## 2024-06-21 PROCEDURE — 2500000001 HC RX 250 WO HCPCS SELF ADMINISTERED DRUGS (ALT 637 FOR MEDICARE OP): Performed by: STUDENT IN AN ORGANIZED HEALTH CARE EDUCATION/TRAINING PROGRAM

## 2024-06-21 PROCEDURE — G0378 HOSPITAL OBSERVATION PER HR: HCPCS

## 2024-06-21 PROCEDURE — 97116 GAIT TRAINING THERAPY: CPT | Mod: GP,CQ

## 2024-06-21 PROCEDURE — 2500000001 HC RX 250 WO HCPCS SELF ADMINISTERED DRUGS (ALT 637 FOR MEDICARE OP)

## 2024-06-21 PROCEDURE — 2500000004 HC RX 250 GENERAL PHARMACY W/ HCPCS (ALT 636 FOR OP/ED)

## 2024-06-21 PROCEDURE — 92526 ORAL FUNCTION THERAPY: CPT | Mod: GN

## 2024-06-21 PROCEDURE — 2500000004 HC RX 250 GENERAL PHARMACY W/ HCPCS (ALT 636 FOR OP/ED): Performed by: NURSE PRACTITIONER

## 2024-06-21 PROCEDURE — 97530 THERAPEUTIC ACTIVITIES: CPT | Mod: GO

## 2024-06-21 RX ORDER — LOSARTAN POTASSIUM 50 MG/1
50 TABLET ORAL DAILY
Status: DISCONTINUED | OUTPATIENT
Start: 2024-06-21 | End: 2024-06-23 | Stop reason: HOSPADM

## 2024-06-21 RX ADMIN — GUAIFENESIN 600 MG: 600 TABLET, EXTENDED RELEASE ORAL at 09:20

## 2024-06-21 RX ADMIN — GUAIFENESIN 600 MG: 600 TABLET, EXTENDED RELEASE ORAL at 20:08

## 2024-06-21 RX ADMIN — ACETAMINOPHEN 650 MG: 325 TABLET ORAL at 09:20

## 2024-06-21 RX ADMIN — SIMVASTATIN 20 MG: 20 TABLET, FILM COATED ORAL at 20:08

## 2024-06-21 RX ADMIN — ASPIRIN 81 MG: 81 TABLET, COATED ORAL at 09:19

## 2024-06-21 RX ADMIN — LOSARTAN POTASSIUM 50 MG: 50 TABLET, FILM COATED ORAL at 18:55

## 2024-06-21 RX ADMIN — PANTOPRAZOLE SODIUM 40 MG: 40 INJECTION, POWDER, FOR SOLUTION INTRAVENOUS at 05:58

## 2024-06-21 RX ADMIN — SODIUM CHLORIDE 75 ML/HR: 9 INJECTION, SOLUTION INTRAVENOUS at 04:41

## 2024-06-21 RX ADMIN — FUROSEMIDE 10 MG: 40 TABLET ORAL at 09:20

## 2024-06-21 RX ADMIN — ENOXAPARIN SODIUM 40 MG: 40 INJECTION SUBCUTANEOUS at 12:06

## 2024-06-21 RX ADMIN — AMLODIPINE BESYLATE 10 MG: 5 TABLET ORAL at 09:19

## 2024-06-21 ASSESSMENT — COGNITIVE AND FUNCTIONAL STATUS - GENERAL
TOILETING: TOTAL
DRESSING REGULAR UPPER BODY CLOTHING: A LOT
EATING MEALS: A LITTLE
TOILETING: TOTAL
DRESSING REGULAR UPPER BODY CLOTHING: A LOT
DRESSING REGULAR LOWER BODY CLOTHING: TOTAL
MOVING TO AND FROM BED TO CHAIR: A LOT
CLIMB 3 TO 5 STEPS WITH RAILING: TOTAL
DRESSING REGULAR LOWER BODY CLOTHING: TOTAL
STANDING UP FROM CHAIR USING ARMS: A LOT
HELP NEEDED FOR BATHING: TOTAL
DAILY ACTIVITIY SCORE: 10
PERSONAL GROOMING: A LOT
PERSONAL GROOMING: A LOT
DAILY ACTIVITIY SCORE: 12
HELP NEEDED FOR BATHING: A LOT
MOVING FROM LYING ON BACK TO SITTING ON SIDE OF FLAT BED WITH BEDRAILS: A LOT
WALKING IN HOSPITAL ROOM: A LOT
TURNING FROM BACK TO SIDE WHILE IN FLAT BAD: A LOT
MOBILITY SCORE: 11

## 2024-06-21 ASSESSMENT — PAIN - FUNCTIONAL ASSESSMENT
PAIN_FUNCTIONAL_ASSESSMENT: 0-10

## 2024-06-21 ASSESSMENT — PAIN SCALES - GENERAL
PAINLEVEL_OUTOF10: 0 - NO PAIN
PAINLEVEL_OUTOF10: 2
PAINLEVEL_OUTOF10: 3

## 2024-06-21 ASSESSMENT — ACTIVITIES OF DAILY LIVING (ADL): HOME_MANAGEMENT_TIME_ENTRY: 5

## 2024-06-21 ASSESSMENT — PAIN DESCRIPTION - ORIENTATION: ORIENTATION: RIGHT

## 2024-06-21 ASSESSMENT — PAIN DESCRIPTION - LOCATION: LOCATION: PELVIS

## 2024-06-21 NOTE — PROGRESS NOTES
06/21/24 1651   Current Planned Discharge Disposition   Current Planned Discharge Disposition SNF  ([facility])     Insurance precert previously requested and remains pending at this time. Pt and spouse updated.

## 2024-06-21 NOTE — CARE PLAN
The patient's goals for the shift include Pts pain will be at a controlled/tolerable level throughout the shift.    The clinical goals for the shift include Pt will tolerate/participate with turning q 2 hr.

## 2024-06-21 NOTE — PROGRESS NOTES
Inpatient Speech Language Pathology Treatment Note     Patient Name: Yared Aquino  MRN: [id number]  : 1940  Today's Date: 24  Time Calculation  Start Time: 1347  Stop Time: 1357  Time Calculation (min): 10 min         PLAN:  Skilled speech therapy for dysphagia treatment continues to be warranted to provide training and instruction regarding the use of compensatory swallow strategies, to complete oropharyngeal strengthening exercises, for pt/caregiver education in order to reduce risk of aspiration, dehydration and malnutrition. , to assess tolerance of diet , to determine ability to upgrade diet after PO trials with SLP  SLP TX Plan: Continue Plan of Care  SLP Frequency: 4x per week  Discussed POC: Patient  Discussed Risks/Benefits: Patient  Patient/Caregiver Agreeable: Yes      Recommendations:   Solid Diet Recommendations: Soft & bite sized/chopped (IDDSI Level 6)  Liquid Diet Recommendations: Thin (IDDSI Level 0)  Compensatory strategies: small bites/sips, alternate bites/sips, upright 90 degrees for intake , no straws , meds crushed in puree     SLP Assessment:  Pt.'s diet changed in system for unknown reason. Replaced with diet that was recommended from MBSS. Pt with multiple straws in room, removed and pt again educated on recommendation of small single sips and no straws. Pt. Required cues to take small sips. Larger sips resulted in coughing post trial. If pt has any decline in respiratory status or consistently is unable to follow recommendations recommend change to nectar thick liquid as these were trialed and no laryngeal penetration or aspiration present.   Pt. Introduced to effortful swallow and natalie. Required moderate cues to complete. Given handout to increase carry over.   Oral care completed during session to decrease risk of infection.         Subjective:  Pt. Seen at bedside for skilled dysphagia treatment.   Prior to Session Communication: Bedside nurse  Pain:  Pain  Assessment  Pain Assessment: 0-10  0-10 (Numeric) Pain Score: 0 - No pain         Oxygen Status:   room air             Goals:    established 06/19/24   Pt to tolerate SBS/thin liquids without pulmonary compromise   2.  Pt will complete effortful swallows x10/3x daily to strengthen pharyngeal muscles for improved bolus clearance and epiglottic inversion. - introduced this date, moderate cues   3.   Pt. Will complete Angela Maneuver x10/3x daily to improve pharyngeal wall contraction and clearance through the pharynx. - introduced this date, moderate cues   4.  Pt to complete lingual strengthening exercises x10/3x daily  to improve tongue base retraction and bolus clearance through pharynx - introduced this date, moderate cues         Treatment Outcome:  SLP TX Intervention Outcome: Making Progress Towards Goals    Treatment Tolerance: Patient tolerated treatment well   Prognosis: Good   Barriers: Comorbidities   Medical Staff Made Aware: Yes       Education:  Pt. Given skilled instruction on safe swallow strategies and swallow exercises.   Pt. gave verbal understanding

## 2024-06-21 NOTE — PROGRESS NOTES
Occupational Therapy    Occupational Therapy Treatment    Name: Yared Aquino  MRN: [id number]  : 1940  Date: 24  Time Calculation  Start Time: 1000  Stop Time: 1028  Time Calculation (min): 28 min    Assessment:  End of Session Patient Position: Up in chair, Alarm on  Plan:  Treatment Interventions: ADL retraining, Functional transfer training  OT Frequency: 3 times per week  OT Discharge Recommendations: Moderate intensity level of continued care  OT Recommended Transfer Status: Moderate assist (FWW)  OT - OK to Discharge: Yes (Pt ok to d/c to next level of care pending medical clearance by team.)    Subjective   Previous Visit Info:  OT Last Visit  OT Received On: 24  General:  General  Prior to Session Communication: Bedside nurse  Patient Position Received: Up in chair, Alarm on  General Comment:  (Agreeable to therapy.)  Precautions:  LE Weight Bearing Status: Weight Bearing as Tolerated  Medical Precautions: Fall precautions  Vitals:     Pain Assessment:  Pain Assessment  Pain Assessment: 0-10  0-10 (Numeric) Pain Score:  (0/10 at rest, 9/10 in standing)  Pain Type: Acute pain  Pain Location: Pelvis  Pain Orientation: Right     Objective   Cognition:  Overall Cognitive Status: Within Functional Limits  Orientation Level: Oriented X4  Activities of Daily Living:      LE Dressing  Sock Level of Assistance: Maximum assistance  Shoe Level of Assistance: Maximum assistance  LE Dressing Where Assessed: Chair    Functional Standing Tolerance:  Functional Standing Tolerance  Time: 1 to 2 mins x 4 trials  Bed Mobility/Transfers:      Transfer 1  Transfer From 1:  (Cues for scooting forward in chair prior to each transfer and to  back of chair at end of session.)  Transfer to 1: Chair with arms  Technique 1: Sit to stand, Stand to sit  Transfer Device 1: Walker, Gait belt  Transfer Level of Assistance 1: Maximum assistance  Trials/Comments 1: sit<>stand at chair x 4.  Cues for hand placements and  body mechanics.  Assist with forward trunk, boost, controlling descent.    Sitting Balance:  Static Sitting Balance  Static Sitting-Comment/Number of Minutes: Fair  Dynamic Sitting Balance  Dynamic Sitting-Comments: Fair (-)  Standing Balance:  Static Standing Balance  Static Standing-Comment/Number of Minutes: Poor (+)  Dynamic Standing Balance  Dynamic Standing-Comments: Poor (+)/poor      Outcome Measures:  [facility] Daily Activity  Putting on and taking off regular lower body clothing: Total  Bathing (including washing, rinsing, drying): A lot  Putting on and taking off regular upper body clothing: A lot  Toileting, which includes using toilet, bedpan or urinal: Total  Taking care of personal grooming such as brushing teeth: A lot  Eating Meals: None  Daily Activity - Total Score: 12        Education Documentation  Body Mechanics, taught by Valentina Bhagat OT at 6/21/2024  2:23 PM.  Learner: Patient  Readiness: Acceptance  Method: Explanation  Response: Verbalizes Understanding, Needs Reinforcement    Precautions, taught by Valentina Bhagat OT at 6/21/2024  2:23 PM.  Learner: Patient  Readiness: Acceptance  Method: Explanation  Response: Verbalizes Understanding, Needs Reinforcement    ADL Training, taught by Valentina Bhagat OT at 6/21/2024  2:23 PM.  Learner: Patient  Readiness: Acceptance  Method: Explanation  Response: Verbalizes Understanding, Needs Reinforcement    Education Comments  No comments found.      Goals:  Encounter Problems       Encounter Problems (Active)       OT Goals       Pt will complete LB dressing with [place] for use of AE.   (Progressing)       Start:  06/18/24    Expected End:  07/02/24            Patient will transfer to bed/chair/toilet with SBA and use of FWW. (Progressing)       Start:  06/18/24    Expected End:  07/02/24            Pt will ambulate functional household distance with [place] of use of FWW to complete I/ADL task.   (Progressing)       Start:  06/18/24     Expected End:  07/02/24

## 2024-06-21 NOTE — CARE PLAN
Problem: Fall/Injury  Goal: Verbalize understanding of personal risk factors for fall in the hospital  Outcome: Progressing  Goal: Verbalize understanding of risk factor reduction measures to prevent injury from fall in the home  Outcome: Progressing  Goal: Use assistive devices by end of the shift  Outcome: Progressing  Goal: Pace activities to prevent fatigue by end of the shift  Outcome: Progressing     Problem: Pain  Goal: Takes deep breaths with improved pain control throughout the shift  Outcome: Progressing  Goal: Turns in bed with improved pain control throughout the shift  Outcome: Progressing  Goal: Walks with improved pain control throughout the shift  Outcome: Progressing  Goal: Performs ADL's with improved pain control throughout shift  Outcome: Progressing  Goal: Participates in PT with improved pain control throughout the shift  Outcome: Progressing  Goal: Free from opioid side effects throughout the shift  Outcome: Progressing  Goal: Free from acute confusion related to pain meds throughout the shift  Outcome: Progressing   The patient's goals for the shift include get some rest    The clinical goals for the shift include Patient will have pain tolerable throughout shift.

## 2024-06-21 NOTE — PROGRESS NOTES
Medical Group Progress Note  ASSESSMENT & PLAN:     1.  Mechanical fall  2.  Acute right pubic ramus fracture  3.  Personal history of laryngeal cancer  4.  Permanent atrial fibrillation status post Watchman device  5.  Diastolic CHF, chronic and compensated  6.  Essential hypertension  7.  Hyperlipidemia  8.  Failure to thrive with concerns for dysphagia  9.  CAD with previous CABG  10.  GERD  11.  Depression    - Patient remains medically cleared for discharge to SNF pending pre-CERT  - Appreciate orthopedic recommendations, no need for surgical repair  - PT and OT with weightbearing as tolerated  - With this history of Justice cancer, worked with speech therapy and has no significant issues or aspiration  - Will continue soft and bite-size/chopped diet per speech therapy  - Amlodipine previously increased to 10 mg, still hypertensive therefore we will start on losartan today    VTE prophylaxis: Enoxaparin subcutaneous      ---Of note, this documentation is completed using the Dragon Dictation system (voice recognition software). There may be spelling and/or grammatical errors that were not corrected prior to final submission.---    Efrain Kumari MD    SUBJECTIVE     Patient was seen and examined bedside this morning.  States having continued pain with ambulation from his fracture otherwise had no concerns.  Aware of pre-CERT pending.    OBJECTIVE:     Last Recorded Vitals:  Vitals:    06/20/24 2025 06/21/24 0525 06/21/24 0918 06/21/24 1521   BP: 139/80 153/75 143/67 136/71   BP Location: Right arm Right arm Left arm    Patient Position: Lying Lying Sitting    Pulse: 78 58 79 68   Resp: 19 18 18    Temp: 36.7 °C (98.1 °F) 36.6 °C (97.9 °F) 36.3 °C (97.3 °F) 35.9 °C (96.6 °F)   TempSrc: Temporal Temporal Temporal    SpO2: 96% 97% 97% 95%   Weight:       Height:         Last I/O:  I/O last 3 completed shifts:  In: 447.5 (6.5 mL/kg) [I.V.:447.5 (6.5 mL/kg)]  Out: 2600 (37.5 mL/kg) [Urine:2600 (1  mL/kg/hr)]  Weight: 69.3 kg     Physical Exam  Vitals reviewed.   Constitutional:       General: He is not in acute distress.     Appearance: Normal appearance. He is not ill-appearing.   HENT:      Head: Normocephalic and atraumatic.   Eyes:      Extraocular Movements: Extraocular movements intact.      Conjunctiva/sclera: Conjunctivae normal.   Cardiovascular:      Rate and Rhythm: Normal rate and regular rhythm.      Pulses: Normal pulses.      Heart sounds: Normal heart sounds.   Pulmonary:      Effort: Pulmonary effort is normal.      Breath sounds: Normal breath sounds.   Abdominal:      General: Bowel sounds are normal.      Palpations: Abdomen is soft.      Tenderness: There is no abdominal tenderness. There is no guarding.   Musculoskeletal:         General: No swelling.      Cervical back: Normal range of motion and neck supple.      Comments: Range of motion of the lower extremities not examined today as patient was sitting in the chair, unremarkable upper extremity range of motion however.   Neurological:      General: No focal deficit present.      Mental Status: He is alert and oriented to person, place, and time. Mental status is at baseline.      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   Psychiatric:         Mood and Affect: Mood normal.         Behavior: Behavior normal.     Inpatient Medications:  amLODIPine, 10 mg, oral, Daily  aspirin, 81 mg, oral, Daily  docusate sodium, 100 mg, oral, BID  enoxaparin, 40 mg, subcutaneous, q24h  furosemide, 10 mg, oral, Daily  guaiFENesin, 600 mg, oral, BID  pantoprazole, 40 mg, oral, Daily   Or  pantoprazole, 40 mg, intravenous, Daily  polyethylene glycol, 17 g, oral, Nightly  psyllium, 1 packet, oral, Daily  sennosides, 1 tablet, oral, Daily  simvastatin, 20 mg, oral, Nightly    PRN Medications  PRN medications: acetaminophen **OR** acetaminophen **OR** acetaminophen, benzocaine-menthol, diclofenac sodium, melatonin, ondansetron  **OR** ondansetron  Continuous Medications:     LABS AND IMAGING:     Labs:  Results from last 7 days   Lab Units 06/21/24  0558 06/20/24  0544 06/19/24  0703   WBC AUTO x10*3/uL 5.9 6.4 7.5   RBC AUTO x10*6/uL 3.68* 3.71* 3.91*   HEMOGLOBIN g/dL 12.0* 12.1* 12.7*   HEMATOCRIT % 36.7* 37.4* 39.5*   MCV fL 100 101* 101*   MCH pg 32.6 32.6 32.5   MCHC g/dL 32.7 32.4 32.2   RDW % 13.2 13.3 13.4   PLATELETS AUTO x10*3/uL 169 166 179     Results from last 7 days   Lab Units 06/21/24  0558 06/20/24  0544 06/19/24  0703   SODIUM mmol/L 137 138 139   POTASSIUM mmol/L 4.2 4.1 4.2   CHLORIDE mmol/L 107 106 107   CO2 mmol/L 24 25 24   BUN mg/dL 35* 32* 30*   CREATININE mg/dL 0.97 0.98 1.00   GLUCOSE mg/dL 96 97 96   PROTEIN TOTAL g/dL 6.1* 6.5 6.4   CALCIUM mg/dL 8.5* 8.6 8.7   BILIRUBIN TOTAL mg/dL 0.4 0.5 0.5   ALK PHOS U/L 99 107 112   AST U/L 10 12 13   ALT U/L 7* 9* 11     Results from last 7 days   Lab Units 06/21/24  0558 06/20/24  0544 06/19/24  0703   MAGNESIUM mg/dL 1.98 1.98 1.95     Results from last 7 days   Lab Units 06/17/24  2210 06/17/24  2106   TROPHS ng/L 6 8     Imaging:  ECG 12 lead  Atrial fibrillation  Low voltage QRS  Incomplete right bundle branch block  Left anterior fascicular block  Cannot rule out Anterior infarct , age undetermined  Abnormal ECG  No previous ECGs available    See ED provider note for full interpretation and clinical correlation    Confirmed by Keyana Schroeder ([id number]) on 6/20/2024 6:55:17 PM  ECG 12 lead  Atrial fibrillation  Left axis deviation  Low voltage QRS  Incomplete right bundle branch block  Cannot rule out Anterior infarct , age undetermined  Abnormal ECG    See ED provider note for full interpretation and clinical correlation    Confirmed by Keyana Schroeder ([id number]) on 6/20/2024 6:54:11 PM

## 2024-06-21 NOTE — PROGRESS NOTES
"Physical Therapy    Physical Therapy Treatment    Patient Name: Yared Aquino  MRN: [id number]  Today's Date: 6/21/2024  Time Calculation  Start Time: 0811  Stop Time: 0837  Time Calculation (min): 26 min     1106/1106-A    Assessment/Plan   End of Session Communication: Bedside nurse  Assessment Comment: Patient presents with fair effort throughout todays session. Receptive to cues although demonstrates self-limiting factors this date. Concern for spouse who is also admitted which required frequent re-directing throughout session. Call light and all needs in reach.  End of Session Patient Position: Up in chair, Alarm on        General Visit Information:   PT  Visit  PT Received On: 06/21/24  General  Prior to Session Communication: Bedside nurse  Patient Position Received: Bed, 3 rail up, Alarm on  General Comment: Pt agreeable to therapy this date.  Subjective     Precautions:  Precautions  LE Weight Bearing Status: Weight Bearing as Tolerated  Medical Precautions: Fall precautions  Precautions Comment: Per EMR: High fall risk      Objective     Pain:  Pain Assessment  Pain Assessment: 0-10  0-10 (Numeric) Pain Score:  (Pt did not rate on scale, states \"only when I stand\")  Pain Type: Acute pain  Pain Location: Pelvis  Pain Orientation: Right      Treatments:  Therapeutic Exercise  Therapeutic Exercise Performed: Yes  Therapeutic Exercise Activity 1: seated, BLE ankle pumps, LAQ, marches 15x VC/demo for proper performance. Advised to perform slow and controlled. Pt unable to obtain full knee ext during LAQ.        Bed Mobility  Bed Mobility: Yes  Bed Mobility 1  Bed Mobility 1: Supine to sitting  Level of Assistance 1: Moderate assistance, Moderate verbal cues, Minimal tactile cues  Bed Mobility Comments 1: Pt performed supine<>EOB ModA for lifting trunk due to difficulty reaching for bedrail to assist with performance. Pt demonstrates ability to walk BLE off edge of bed with increased time required to complete " "task.  Ambulation/Gait Training  Ambulation/Gait Training Performed: Yes  Ambulation/Gait Training 1  Surface 1: Level tile  Device 1: Rolling walker (FWW)  Gait Support Devices: Gait belt  Assistance 1: Moderate assistance, Maximum verbal cues  Quality of Gait 1: Antalgic, Soft knee(s), Forward flexed posture  Comments/Distance (ft) 1: Pt ambulated 3' ModA with FWW. Pt demonstrates  a step to pattern leading with RLE, antalgic throughout. VC for bringing LLE forward. Short step length throughout. Fair AD management with VC to stay within walker.  Transfers  Transfer: Yes  Transfer 1  Transfer From 1: Bed to  Transfer to 1: Chair with arms  Technique 1: Stand pivot  Transfer Device 1: Walker (FWW)  Transfer Level of Assistance 1: Moderate assistance  Trials/Comments 1: Pt performed stand pivot from EOB<>chair ModA for controlling descent. Pt requires VC for sequencing, fair eccentric control to sitting position. Fair AD management throughout.  Transfers 2  Transfer From 2: Bed to  Transfer to 2: Stand  Technique 2: Stand to sit, Sit to stand  Transfer Device 2: Gait belt  Transfer Level of Assistance 2: Maximum assistance  Trials/Comments 2: Pt performed STS 2x from EOB<>FWW MaxA. VC required for sequencing. First attempt unable to clear buttock, VC for \"nose over toes\" as pt was attempting to push straight up from bed.          Outcome Measures:  [facility] Basic Mobility  Turning from your back to your side while in a flat bed without using bedrails: A lot  Moving from lying on your back to sitting on the side of a flat bed without using bedrails: A lot  Moving to and from bed to chair (including a wheelchair): A lot  Standing up from a chair using your arms (e.g. wheelchair or bedside chair): A lot  To walk in hospital room: A lot  Climbing 3-5 steps with railing: Total  Basic Mobility - Total Score: 11  Education Documentation  Mobility Training, taught by Valentina Acuna PTA at 6/21/2024 10:51 AM.  Learner: " Patient  Readiness: Acceptance  Method: Explanation, Demonstration  Response: Verbalizes Understanding, Needs Reinforcement    Education Comments  No comments found.        EDUCATION:  Outpatient Education  Individual(s) Educated: Patient  Education Provided: Body Mechanics, Fall Risk, Home Exercise Program, Home Safety  Education Comment: Proper sequencing and safety with bed mobility and transfers for optimal performance and safety with task.  Encounter Problems       Encounter Problems (Active)       PT Problem       Pt. will transfer supine/sit with SBA (Progressing)       Start:  06/18/24    Expected End:  07/02/24            Pt. will transfer sit/stand with FWW with SBA (Progressing)       Start:  06/18/24    Expected End:  07/02/24            Pt.will ambulate 40' with FWW with CGA (Progressing)       Start:  06/18/24    Expected End:  07/02/24            Pt. will perform 2 x 15 B LE AROM exercises  (Progressing)       Start:  06/18/24    Expected End:  07/02/24

## 2024-06-22 LAB
ALBUMIN SERPL BCP-MCNC: 3.3 G/DL (ref 3.4–5)
ALP SERPL-CCNC: 98 U/L (ref 33–136)
ALT SERPL W P-5'-P-CCNC: 6 U/L (ref 10–52)
ANION GAP SERPL CALC-SCNC: 11 MMOL/L (ref 10–20)
AST SERPL W P-5'-P-CCNC: 10 U/L (ref 9–39)
BASOPHILS # BLD AUTO: 0.06 X10*3/UL (ref 0–0.1)
BASOPHILS NFR BLD AUTO: 0.9 %
BILIRUB SERPL-MCNC: 0.4 MG/DL (ref 0–1.2)
BUN SERPL-MCNC: 40 MG/DL (ref 6–23)
CALCIUM SERPL-MCNC: 8.6 MG/DL (ref 8.6–10.3)
CHLORIDE SERPL-SCNC: 107 MMOL/L (ref 98–107)
CO2 SERPL-SCNC: 23 MMOL/L (ref 21–32)
CREAT SERPL-MCNC: 1.03 MG/DL (ref 0.5–1.3)
EGFRCR SERPLBLD CKD-EPI 2021: 72 ML/MIN/1.73M*2
EOSINOPHIL # BLD AUTO: 0.25 X10*3/UL (ref 0–0.4)
EOSINOPHIL NFR BLD AUTO: 3.8 %
ERYTHROCYTE [DISTWIDTH] IN BLOOD BY AUTOMATED COUNT: 13.5 % (ref 11.5–14.5)
GLUCOSE SERPL-MCNC: 103 MG/DL (ref 74–99)
HCT VFR BLD AUTO: 35.5 % (ref 41–52)
HGB BLD-MCNC: 11.3 G/DL (ref 13.5–17.5)
HOLD SPECIMEN: NORMAL
IMM GRANULOCYTES # BLD AUTO: 0.06 X10*3/UL (ref 0–0.5)
IMM GRANULOCYTES NFR BLD AUTO: 0.9 % (ref 0–0.9)
LYMPHOCYTES # BLD AUTO: 1.43 X10*3/UL (ref 0.8–3)
LYMPHOCYTES NFR BLD AUTO: 21.9 %
MAGNESIUM SERPL-MCNC: 2.14 MG/DL (ref 1.6–2.4)
MCH RBC QN AUTO: 32.2 PG (ref 26–34)
MCHC RBC AUTO-ENTMCNC: 31.8 G/DL (ref 32–36)
MCV RBC AUTO: 101 FL (ref 80–100)
MONOCYTES # BLD AUTO: 0.77 X10*3/UL (ref 0.05–0.8)
MONOCYTES NFR BLD AUTO: 11.8 %
NEUTROPHILS # BLD AUTO: 3.96 X10*3/UL (ref 1.6–5.5)
NEUTROPHILS NFR BLD AUTO: 60.7 %
NRBC BLD-RTO: 0 /100 WBCS (ref 0–0)
PLATELET # BLD AUTO: 183 X10*3/UL (ref 150–450)
POTASSIUM SERPL-SCNC: 4.1 MMOL/L (ref 3.5–5.3)
PROT SERPL-MCNC: 6.2 G/DL (ref 6.4–8.2)
RBC # BLD AUTO: 3.51 X10*6/UL (ref 4.5–5.9)
SODIUM SERPL-SCNC: 137 MMOL/L (ref 136–145)
WBC # BLD AUTO: 6.5 X10*3/UL (ref 4.4–11.3)

## 2024-06-22 PROCEDURE — 1210000001 HC SEMI-PRIVATE ROOM DAILY

## 2024-06-22 PROCEDURE — 97535 SELF CARE MNGMENT TRAINING: CPT | Mod: GO,CO

## 2024-06-22 PROCEDURE — G0378 HOSPITAL OBSERVATION PER HR: HCPCS

## 2024-06-22 PROCEDURE — 97530 THERAPEUTIC ACTIVITIES: CPT | Mod: GO,CO

## 2024-06-22 PROCEDURE — 36415 COLL VENOUS BLD VENIPUNCTURE: CPT | Performed by: STUDENT IN AN ORGANIZED HEALTH CARE EDUCATION/TRAINING PROGRAM

## 2024-06-22 PROCEDURE — 2500000001 HC RX 250 WO HCPCS SELF ADMINISTERED DRUGS (ALT 637 FOR MEDICARE OP): Performed by: STUDENT IN AN ORGANIZED HEALTH CARE EDUCATION/TRAINING PROGRAM

## 2024-06-22 PROCEDURE — 2500000001 HC RX 250 WO HCPCS SELF ADMINISTERED DRUGS (ALT 637 FOR MEDICARE OP): Performed by: NURSE PRACTITIONER

## 2024-06-22 PROCEDURE — 85025 COMPLETE CBC W/AUTO DIFF WBC: CPT | Performed by: STUDENT IN AN ORGANIZED HEALTH CARE EDUCATION/TRAINING PROGRAM

## 2024-06-22 PROCEDURE — 2500000004 HC RX 250 GENERAL PHARMACY W/ HCPCS (ALT 636 FOR OP/ED)

## 2024-06-22 PROCEDURE — 2500000002 HC RX 250 W HCPCS SELF ADMINISTERED DRUGS (ALT 637 FOR MEDICARE OP, ALT 636 FOR OP/ED)

## 2024-06-22 PROCEDURE — 84075 ASSAY ALKALINE PHOSPHATASE: CPT | Performed by: STUDENT IN AN ORGANIZED HEALTH CARE EDUCATION/TRAINING PROGRAM

## 2024-06-22 PROCEDURE — 2500000001 HC RX 250 WO HCPCS SELF ADMINISTERED DRUGS (ALT 637 FOR MEDICARE OP)

## 2024-06-22 PROCEDURE — 99232 SBSQ HOSP IP/OBS MODERATE 35: CPT | Performed by: STUDENT IN AN ORGANIZED HEALTH CARE EDUCATION/TRAINING PROGRAM

## 2024-06-22 PROCEDURE — 83735 ASSAY OF MAGNESIUM: CPT | Performed by: STUDENT IN AN ORGANIZED HEALTH CARE EDUCATION/TRAINING PROGRAM

## 2024-06-22 RX ADMIN — ACETAMINOPHEN 650 MG: 325 TABLET ORAL at 22:18

## 2024-06-22 RX ADMIN — STANDARDIZED SENNA CONCENTRATE 8.6 MG: 8.6 TABLET ORAL at 08:44

## 2024-06-22 RX ADMIN — ENOXAPARIN SODIUM 40 MG: 40 INJECTION SUBCUTANEOUS at 12:45

## 2024-06-22 RX ADMIN — GUAIFENESIN 600 MG: 600 TABLET, EXTENDED RELEASE ORAL at 20:22

## 2024-06-22 RX ADMIN — SIMVASTATIN 20 MG: 20 TABLET, FILM COATED ORAL at 20:22

## 2024-06-22 RX ADMIN — FUROSEMIDE 10 MG: 40 TABLET ORAL at 08:45

## 2024-06-22 RX ADMIN — ASPIRIN 81 MG: 81 TABLET, COATED ORAL at 08:45

## 2024-06-22 RX ADMIN — PANTOPRAZOLE SODIUM 40 MG: 40 TABLET, DELAYED RELEASE ORAL at 05:59

## 2024-06-22 RX ADMIN — LOSARTAN POTASSIUM 50 MG: 50 TABLET, FILM COATED ORAL at 08:44

## 2024-06-22 RX ADMIN — AMLODIPINE BESYLATE 10 MG: 5 TABLET ORAL at 08:45

## 2024-06-22 RX ADMIN — GUAIFENESIN 600 MG: 600 TABLET, EXTENDED RELEASE ORAL at 08:45

## 2024-06-22 ASSESSMENT — PAIN SCALES - GENERAL
PAINLEVEL_OUTOF10: 5 - MODERATE PAIN
PAINLEVEL_OUTOF10: 0 - NO PAIN

## 2024-06-22 ASSESSMENT — COGNITIVE AND FUNCTIONAL STATUS - GENERAL
DAILY ACTIVITIY SCORE: 12
PERSONAL GROOMING: A LOT
EATING MEALS: A LITTLE
TOILETING: TOTAL
DRESSING REGULAR LOWER BODY CLOTHING: A LOT
DRESSING REGULAR UPPER BODY CLOTHING: A LOT
HELP NEEDED FOR BATHING: A LOT

## 2024-06-22 ASSESSMENT — PAIN - FUNCTIONAL ASSESSMENT
PAIN_FUNCTIONAL_ASSESSMENT: 0-10
PAIN_FUNCTIONAL_ASSESSMENT: 0-10

## 2024-06-22 ASSESSMENT — PAIN DESCRIPTION - LOCATION: LOCATION: LEG

## 2024-06-22 ASSESSMENT — ACTIVITIES OF DAILY LIVING (ADL): HOME_MANAGEMENT_TIME_ENTRY: 13

## 2024-06-22 NOTE — CARE PLAN
Problem: Fall/Injury  Goal: Verbalize understanding of personal risk factors for fall in the hospital  Outcome: Progressing  Goal: Verbalize understanding of risk factor reduction measures to prevent injury from fall in the home  Outcome: Progressing  Goal: Use assistive devices by end of the shift  Outcome: Progressing  Goal: Pace activities to prevent fatigue by end of the shift  Outcome: Progressing     Problem: Pain  Goal: Takes deep breaths with improved pain control throughout the shift  Outcome: Progressing  Goal: Turns in bed with improved pain control throughout the shift  Outcome: Progressing  Goal: Walks with improved pain control throughout the shift  Outcome: Progressing  Goal: Performs ADL's with improved pain control throughout shift  Outcome: Progressing  Goal: Participates in PT with improved pain control throughout the shift  Outcome: Progressing  Goal: Free from opioid side effects throughout the shift  Outcome: Progressing  Goal: Free from acute confusion related to pain meds throughout the shift  Outcome: Progressing     Problem: Skin  Goal: Participates in plan/prevention/treatment measures  Outcome: Progressing  Goal: Prevent/manage excess moisture  Outcome: Progressing  Goal: Prevent/minimize sheer/friction injuries  Outcome: Progressing  Goal: Promote/optimize nutrition  Outcome: Progressing   The patient's goals for the shift include get some rest    The clinical goals for the shift include monitor vs, labs, I&O's; manage pain; promote safe ambulation with assistance; rest

## 2024-06-22 NOTE — PROGRESS NOTES
Medical Group Progress Note  ASSESSMENT & PLAN:     1.  Mechanical fall  2.  Acute right pubic ramus fracture  3.  Personal history of laryngeal cancer  4.  Permanent atrial fibrillation status post Watchman device  5.  Diastolic CHF, chronic and compensated  6.  Essential hypertension  7.  Hyperlipidemia  8.  Failure to thrive with concerns for dysphagia  9.  CAD with previous CABG  10.  GERD  11.  Depression    - Patient remains medically cleared for discharge to SNF pending pre-CERT  - Appreciate orthopedic recommendations, no need for surgical repair  - PT and OT with weightbearing as tolerated  - With this history of laryngeal cancer, worked with speech therapy and has no significant issues or aspiration  - Will continue soft and bite-size/chopped diet per speech therapy  - Blood pressure improving with increased amlodipine and now on losartan  - Still awaiting pre-CERT    VTE prophylaxis: Enoxaparin subcutaneous      ---Of note, this documentation is completed using the Dragon Dictation system (voice recognition software). There may be spelling and/or grammatical errors that were not corrected prior to final submission.---    Efrain Kumari MD    SUBJECTIVE     Patient was seen and examined at bedside this morning.  Had no acute events overnight.  Pain is stable, aware of pending pre-CERT.    OBJECTIVE:     Last Recorded Vitals:  Vitals:    06/22/24 0000 06/22/24 0419 06/22/24 0845 06/22/24 1417   BP: 126/67 136/73 145/67 116/65   BP Location:   Right arm Right arm   Patient Position:   Lying Lying   Pulse: 67 68 70 66   Resp: 18 16  18   Temp: 36.2 °C (97.2 °F) 36 °C (96.8 °F)  36.3 °C (97.3 °F)   TempSrc: Temporal Temporal  Temporal   SpO2: 97% 96%  97%   Weight:       Height:         Last I/O:  I/O last 3 completed shifts:  In: 922.5 (13.3 mL/kg) [I.V.:922.5 (13.3 mL/kg)]  Out: 1000 (14.4 mL/kg) [Urine:1000 (0.4 mL/kg/hr)]  Weight: 69.3 kg     Physical Exam  Vitals reviewed.   Constitutional:        General: He is not in acute distress.     Appearance: Normal appearance. He is not ill-appearing.   HENT:      Head: Normocephalic and atraumatic.   Eyes:      Extraocular Movements: Extraocular movements intact.      Conjunctiva/sclera: Conjunctivae normal.   Cardiovascular:      Rate and Rhythm: Normal rate and regular rhythm.      Pulses: Normal pulses.      Heart sounds: Normal heart sounds.   Pulmonary:      Effort: Pulmonary effort is normal.      Breath sounds: Normal breath sounds.   Abdominal:      General: Bowel sounds are normal.      Palpations: Abdomen is soft.      Tenderness: There is no abdominal tenderness. There is no guarding.   Musculoskeletal:         General: No swelling.      Cervical back: Normal range of motion and neck supple.      Comments: Range of motion of the lower extremities not examined today as patient was sitting in the chair, unremarkable upper extremity range of motion however.   Neurological:      General: No focal deficit present.      Mental Status: He is alert and oriented to person, place, and time. Mental status is at baseline.      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   Psychiatric:         Mood and Affect: Mood normal.         Behavior: Behavior normal.     Inpatient Medications:  amLODIPine, 10 mg, oral, Daily  aspirin, 81 mg, oral, Daily  docusate sodium, 100 mg, oral, BID  enoxaparin, 40 mg, subcutaneous, q24h  furosemide, 10 mg, oral, Daily  guaiFENesin, 600 mg, oral, BID  losartan, 50 mg, oral, Daily  pantoprazole, 40 mg, oral, Daily   Or  pantoprazole, 40 mg, intravenous, Daily  polyethylene glycol, 17 g, oral, Nightly  psyllium, 1 packet, oral, Daily  sennosides, 1 tablet, oral, Daily  simvastatin, 20 mg, oral, Nightly    PRN Medications  PRN medications: acetaminophen **OR** acetaminophen **OR** acetaminophen, benzocaine-menthol, diclofenac sodium, melatonin, ondansetron **OR** ondansetron  Continuous Medications:     LABS AND  IMAGING:     Labs:  Results from last 7 days   Lab Units 06/22/24  0534 06/21/24  0558 06/20/24  0544   WBC AUTO x10*3/uL 6.5 5.9 6.4   RBC AUTO x10*6/uL 3.51* 3.68* 3.71*   HEMOGLOBIN g/dL 11.3* 12.0* 12.1*   HEMATOCRIT % 35.5* 36.7* 37.4*   MCV fL 101* 100 101*   MCH pg 32.2 32.6 32.6   MCHC g/dL 31.8* 32.7 32.4   RDW % 13.5 13.2 13.3   PLATELETS AUTO x10*3/uL 183 169 166     Results from last 7 days   Lab Units 06/22/24  0534 06/21/24  0558 06/20/24  0544   SODIUM mmol/L 137 137 138   POTASSIUM mmol/L 4.1 4.2 4.1   CHLORIDE mmol/L 107 107 106   CO2 mmol/L 23 24 25   BUN mg/dL 40* 35* 32*   CREATININE mg/dL 1.03 0.97 0.98   GLUCOSE mg/dL 103* 96 97   PROTEIN TOTAL g/dL 6.2* 6.1* 6.5   CALCIUM mg/dL 8.6 8.5* 8.6   BILIRUBIN TOTAL mg/dL 0.4 0.4 0.5   ALK PHOS U/L 98 99 107   AST U/L 10 10 12   ALT U/L 6* 7* 9*     Results from last 7 days   Lab Units 06/22/24  0534 06/21/24  0558 06/20/24  0544   MAGNESIUM mg/dL 2.14 1.98 1.98     Results from last 7 days   Lab Units 06/17/24  2210 06/17/24  2106   TROPHS ng/L 6 8     Imaging:  ECG 12 lead  Atrial fibrillation  Low voltage QRS  Incomplete right bundle branch block  Left anterior fascicular block  Cannot rule out Anterior infarct , age undetermined  Abnormal ECG  No previous ECGs available    See ED provider note for full interpretation and clinical correlation    Confirmed by Keyana Schroeder ([id number]) on 6/20/2024 6:55:17 PM  ECG 12 lead  Atrial fibrillation  Left axis deviation  Low voltage QRS  Incomplete right bundle branch block  Cannot rule out Anterior infarct , age undetermined  Abnormal ECG    See ED provider note for full interpretation and clinical correlation    Confirmed by Keyana Schroeder ([id number]) on 6/20/2024 6:54:11 PM

## 2024-06-22 NOTE — PROGRESS NOTES
Occupational Therapy    OT Treatment    Patient Name: Yared Aquino  MRN: [id number]  Today's Date: 6/22/2024  Time Calculation  Start Time: 1001  Stop Time: 1024  Time Calculation (min): 23 min       Assessment:  End of Session Communication: Bedside nurse  End of Session Patient Position: Up in chair, Alarm on     Plan:  Treatment Interventions: ADL retraining, Functional transfer training  OT Frequency: 3 times per week  Treatment Interventions: ADL retraining, Functional transfer training    Subjective   Previous Visit Info:  OT Last Visit  OT Received On: 06/22/24  General:  General  Reason for Referral: fall- acute R inferior pubic ramus fracture  Prior to Session Communication: Bedside nurse  Patient Position Received: Bed, 3 rail up, Alarm on  Precautions:  LE Weight Bearing Status: Weight Bearing as Tolerated  Medical Precautions: Fall precautions     Pain:  Pain Assessment  Pain Assessment: 0-10  0-10 (Numeric) Pain Score: 0 - No pain    Objective    Cognition:  Cognition  Overall Cognitive Status: Impaired  Orientation Level: Oriented X4  Following Commands: Follows multistep commands with increased time (~75%`)  Safety Judgment: Decreased awareness of need for safety precautions  Problem Solving: Assistance required to implement solutions  Insight: Moderate  Impulsive: Mildly     Activities of Daily Living: LE Dressing  Shoe Level of Assistance: Maximum assistance  LE Dressing Where Assessed: Edge of bed    Toileting  Toileting Level of Assistance: Dependent  Where Assessed:  (standing at [facility])  Functional Standing Tolerance:  Functional Standing Tolerance Comments: patient stood for a total of 5 mins this date with poor/poor+ balance with 2-1 ue support at all times during funcitonal ambulation/transfers and ADL tasks  Bed Mobility/Transfers: Bed Mobility  Bed Mobility: Yes  Bed Mobility 1  Bed Mobility 1: Supine to sitting  Level of Assistance 1: Maximum assistance, Maximum verbal cues, Maximum tactile  cues    Transfer 1  Technique 1: Sit to stand  Transfer Device 1: Walker, Gait belt  Transfer Level of Assistance 1: Moderate assistance, +2, Maximum tactile cues, Maximum verbal cues (for walker mgmt and seq)  Trials/Comments 1: `  Transfers 2  Transfer From 2:  (Bed>BSC >chair)  Transfer Device 2: Walker, Gait belt  Transfer Level of Assistance 2: Moderate assistance, +2, Maximum tactile cues, Maximum verbal cues    Toilet Transfers  Toilet Transfer Type: To and from  Toilet Transfer to: Extra wide bedside commode  Toilet Transfer Technique: Ambulating  Toilet Transfers: Moderate assistance (x2 with max a for walker mgmt)  Sitting Balance:  Dynamic Sitting Balance  Dynamic Sitting-Balance:  (fair/fair-)  Standing Balance:  Dynamic Standing Balance  Dynamic Standing-Balance:  (poor/poor- with posterior lean throughout)    Outcome Measures:[facility] Daily Activity  Putting on and taking off regular lower body clothing: A lot  Bathing (including washing, rinsing, drying): A lot  Putting on and taking off regular upper body clothing: A lot  Toileting, which includes using toilet, bedpan or urinal: Total  Taking care of personal grooming such as brushing teeth: A lot  Eating Meals: A little  Daily Activity - Total Score: 12    Education Documentation  Body Mechanics, taught by MARTÍN Rush at 6/22/2024 12:23 PM.  Learner: Patient  Readiness: Acceptance  Method: Explanation  Response: Needs Reinforcement    Precautions, taught by MARTÍN Rush at 6/22/2024 12:23 PM.  Learner: Patient  Readiness: Acceptance  Method: Explanation  Response: Needs Reinforcement    ADL Training, taught by MARTÍN Rush at 6/22/2024 12:23 PM.  Learner: Patient  Readiness: Acceptance  Method: Explanation  Response: Needs Reinforcement    OP EDUCATION:  Education  Individual(s) Educated: Patient  Education Provided: Diagnosis & Precautions, Symptom management, Ergonomics and postural realignment, Joint protection and energy  conservation, Fall precautons, Risk and benefits of OT discussed with patient or other, POC discussed and agreed upon  Diagnosis and Precautions: RLE WBAT  Equipment:  (AE, EC tech, home DME)  Patient/Caregiver Demonstrated Understanding: yes  Plan of Care Discussed and Agreed Upon: yes  Patient Response to Education: Patient/Caregiver Verbalized Understanding of Information    Goals:  Encounter Problems       Encounter Problems (Active)       OT Goals       Pt will complete LB dressing with [place] for use of AE.   (Progressing)       Start:  06/18/24    Expected End:  07/02/24            Patient will transfer to bed/chair/toilet with SBA and use of FWW. (Progressing)       Start:  06/18/24    Expected End:  07/02/24            Pt will ambulate functional household distance with [place] of use of FWW to complete I/ADL task.   (Progressing)       Start:  06/18/24    Expected End:  07/02/24

## 2024-06-22 NOTE — CARE PLAN
The patient's goals for the shift include get some rest    The clinical goals for the shift include Pt will have pain less than 4/10 during this shift    Pt hd no c/o pain during the shift and appeared to sleep well overnight.    Problem: Fall/Injury  Goal: Verbalize understanding of personal risk factors for fall in the hospital  Outcome: Progressing  Goal: Pace activities to prevent fatigue by end of the shift  Outcome: Progressing     Problem: Pain  Goal: Turns in bed with improved pain control throughout the shift  Outcome: Progressing  Goal: Performs ADL's with improved pain control throughout shift  Outcome: Progressing     Problem: Skin  Goal: Participates in plan/prevention/treatment measures  Outcome: Progressing  Flowsheets (Taken 6/22/2024 0531)  Participates in plan/prevention/treatment measures:   Elevate heels   Increase activity/out of bed for meals  Goal: Prevent/manage excess moisture  Outcome: Progressing  Flowsheets (Taken 6/22/2024 0531)  Prevent/manage excess moisture:   Cleanse incontinence/protect with barrier cream   Monitor for/manage infection if present      Uncontrolled. Continue losartan and start chlorthalidone.

## 2024-06-23 VITALS
HEART RATE: 65 BPM | TEMPERATURE: 97.3 F | WEIGHT: 152.78 LBS | HEIGHT: 74 IN | BODY MASS INDEX: 19.61 KG/M2 | SYSTOLIC BLOOD PRESSURE: 132 MMHG | OXYGEN SATURATION: 95 % | RESPIRATION RATE: 16 BRPM | DIASTOLIC BLOOD PRESSURE: 61 MMHG

## 2024-06-23 PROCEDURE — 97116 GAIT TRAINING THERAPY: CPT | Mod: GP,CQ

## 2024-06-23 PROCEDURE — 2500000001 HC RX 250 WO HCPCS SELF ADMINISTERED DRUGS (ALT 637 FOR MEDICARE OP)

## 2024-06-23 PROCEDURE — 2500000004 HC RX 250 GENERAL PHARMACY W/ HCPCS (ALT 636 FOR OP/ED)

## 2024-06-23 PROCEDURE — 99239 HOSP IP/OBS DSCHRG MGMT >30: CPT | Performed by: STUDENT IN AN ORGANIZED HEALTH CARE EDUCATION/TRAINING PROGRAM

## 2024-06-23 PROCEDURE — 2500000001 HC RX 250 WO HCPCS SELF ADMINISTERED DRUGS (ALT 637 FOR MEDICARE OP): Performed by: NURSE PRACTITIONER

## 2024-06-23 PROCEDURE — G0378 HOSPITAL OBSERVATION PER HR: HCPCS

## 2024-06-23 PROCEDURE — 2500000001 HC RX 250 WO HCPCS SELF ADMINISTERED DRUGS (ALT 637 FOR MEDICARE OP): Performed by: STUDENT IN AN ORGANIZED HEALTH CARE EDUCATION/TRAINING PROGRAM

## 2024-06-23 PROCEDURE — 97530 THERAPEUTIC ACTIVITIES: CPT | Mod: GP,CQ

## 2024-06-23 RX ORDER — LOSARTAN POTASSIUM 50 MG/1
50 TABLET ORAL DAILY
Start: 2024-06-24 | End: 2024-07-24

## 2024-06-23 RX ORDER — AMLODIPINE BESYLATE 10 MG/1
10 TABLET ORAL DAILY
Start: 2024-06-23

## 2024-06-23 RX ADMIN — PANTOPRAZOLE SODIUM 40 MG: 40 TABLET, DELAYED RELEASE ORAL at 05:42

## 2024-06-23 RX ADMIN — AMLODIPINE BESYLATE 10 MG: 5 TABLET ORAL at 09:59

## 2024-06-23 RX ADMIN — ASPIRIN 81 MG: 81 TABLET, COATED ORAL at 09:59

## 2024-06-23 RX ADMIN — LOSARTAN POTASSIUM 50 MG: 50 TABLET, FILM COATED ORAL at 09:59

## 2024-06-23 RX ADMIN — GUAIFENESIN 600 MG: 600 TABLET, EXTENDED RELEASE ORAL at 09:59

## 2024-06-23 RX ADMIN — FUROSEMIDE 10 MG: 40 TABLET ORAL at 09:59

## 2024-06-23 RX ADMIN — ENOXAPARIN SODIUM 40 MG: 40 INJECTION SUBCUTANEOUS at 12:04

## 2024-06-23 ASSESSMENT — PAIN - FUNCTIONAL ASSESSMENT: PAIN_FUNCTIONAL_ASSESSMENT: 0-10

## 2024-06-23 ASSESSMENT — COGNITIVE AND FUNCTIONAL STATUS - GENERAL
MOBILITY SCORE: 11
CLIMB 3 TO 5 STEPS WITH RAILING: TOTAL
MOVING TO AND FROM BED TO CHAIR: A LOT
MOVING FROM LYING ON BACK TO SITTING ON SIDE OF FLAT BED WITH BEDRAILS: A LOT
STANDING UP FROM CHAIR USING ARMS: A LOT
TURNING FROM BACK TO SIDE WHILE IN FLAT BAD: A LOT
WALKING IN HOSPITAL ROOM: A LOT

## 2024-06-23 ASSESSMENT — PAIN SCALES - GENERAL: PAINLEVEL_OUTOF10: 0 - NO PAIN

## 2024-06-23 NOTE — PROGRESS NOTES
Physical Therapy    Physical Therapy Treatment    Patient Name: Yared Aquino  MRN: [id number]  Today's Date: 6/23/2024  Time Calculation  Start Time: 0908  Stop Time: 0931  Time Calculation (min): 23 min    Assessment/Plan   PT Assessment  PT Assessment Results: Decreased strength, Decreased endurance, Impaired balance, Decreased mobility, Pain  Rehab Prognosis: Good  End of Session Communication: Bedside nurse  Assessment Comment: Pt continues to progress toward goals and will benefit from continued PT services to further progress strength, endurance and functional mobility.  End of Session Patient Position: Up in chair, Alarm on  PT Plan  Inpatient/Swing Bed or Outpatient: Inpatient  PT Plan  Treatment/Interventions: Bed mobility, Transfer training, Gait training, Therapeutic exercise  PT Plan: Ongoing PT  PT Frequency: 4 times per week  PT Discharge Recommendations: Moderate intensity level of continued care  PT Recommended Transfer Status: Assist x2, Assistive device    General Visit Information:   PT  Visit  PT Received On: 06/23/24  General  Reason for Referral: fall- acute R inferior pubic ramus fracture  Referred By: PT/OT AYALA Graf CNP 6/18  Past Medical History Relevant to Rehab: 84y old male admitted for a mechanical fall at home. History of  HLD, MI, neuropathy, CHF, A-fb, CKD, GERD, B JACKELYN with ORIF R femur, Watchman's, falls, LBP, laryngeal CA, sciatica, tricuspid valve regurgitation, CABG, [place], MDD.  Prior to Session Communication: Bedside nurse  Patient Position Received: Bed, 3 rail up, Alarm on  General Comment: Pt is pleasant and cooperative, agreeable to PT services.    Subjective   Precautions:  Precautions  Medical Precautions: Fall precautions  Vital Signs:       Objective   Pain:  Pain Assessment  Pain Assessment: 0-10  0-10 (Numeric) Pain Score:  (Pt states pain with standing but does not quantify with a number.)  Cognition:  Cognition  Orientation Level: Oriented X4  Coordination:      Postural Control:     Extremity/Trunk Assessments:        Activity Tolerance:  Activity Tolerance  Endurance: Decreased tolerance for upright activites  Treatments:  Therapeutic Exercise  Therapeutic Exercise Performed: Yes (Seated AP, LAQ, marches, abd/add x 15ea BLE)    Bed Mobility  Bed Mobility: Yes (Sup>sit with mod  A and pt needing hand over hand assist to reach for bed rail. PTA using davida pad to position pt hips at EOB unable to scoot fwd. Max A to susan pt shoes at EOB, prefers prior to gait.)    Ambulation/Gait Training  Ambulation/Gait Training Performed: Yes (Pt ambulating 6' from bed to chair with WW and mod A. Pt demos a slow, step to gait with decreased step height/length. Assist with WW management.)  Transfers  Transfer: Yes (Sit<>stand trasnfers from EOB and chair with WW and mod a. Verbal cues for hand placement throughout. Very slow descend.)    Outcome Measures:  [facility] Basic Mobility  Turning from your back to your side while in a flat bed without using bedrails: A lot  Moving from lying on your back to sitting on the side of a flat bed without using bedrails: A lot  Moving to and from bed to chair (including a wheelchair): A lot  Standing up from a chair using your arms (e.g. wheelchair or bedside chair): A lot  To walk in hospital room: A lot  Climbing 3-5 steps with railing: Total  Basic Mobility - Total Score: 11    Education Documentation  Mobility Training, taught by Chastity Mortensen PTA at 6/23/2024 12:45 PM.  Learner: Patient  Readiness: Acceptance  Method: Explanation  Response: Needs Reinforcement    Education Comments  No comments found.        EDUCATION:  Outpatient Education  Individual(s) Educated: Patient  Education Provided: Body Mechanics, Fall Risk, Home Exercise Program, Home Safety    Encounter Problems       Encounter Problems (Active)       PT Problem       Pt. will transfer supine/sit with SBA (Progressing)       Start:  06/18/24    Expected End:  07/02/24            Pt.  will transfer sit/stand with FWW with SBA (Progressing)       Start:  06/18/24    Expected End:  07/02/24            Pt.will ambulate 40' with FWW with CGA (Progressing)       Start:  06/18/24    Expected End:  07/02/24            Pt. will perform 2 x 15 B LE AROM exercises  (Progressing)       Start:  06/18/24    Expected End:  07/02/24

## 2024-06-23 NOTE — PROGRESS NOTES
06/23/24 1220   Current Planned Discharge Disposition   Current Planned Discharge Disposition SNF  ([facility] sn[facility])     Ins. Precert has been obtained.  Pt. Will discharge this date to [facility] snf at 2:30 pm via ambulance.  Message left for spouse to inform.  Pt. Aware.

## 2024-06-23 NOTE — DISCHARGE SUMMARY
Medical Group Discharge Summary  DISCHARGE DIAGNOSIS     1.  Mechanical fall  2.  Acute right pubic ramus fracture  3.  Personal history of laryngeal cancer  4.  Permanent atrial fibrillation status post Watchman device  5.  Diastolic CHF, chronic and compensated  6.  Essential hypertension  7.  Hyperlipidemia  8.  Failure to thrive with concerns for dysphagia  9.  CAD with previous CABG  10.  GERD  11.  Depression    HOSPITAL COURSE AND DETAILS     This is an 84-year-old male with a significant past medical history of permanent atrial fibrillation status post Watchman device, laryngeal cancer, peripheral neuropathy, history of recurrent falls that presents from home after a mechanical fall at home.    Patient was found to have acute right pubic rami fracture.  Was seen by orthopedic surgery and deemed conservative management, no surgical intervention.  Worked with PT/OT and was deemed a candidate for SNF placement.  Was seen by speech therapy with concerns for dysphagia with patient's history of laryngeal cancer, recommended bite-size food and thin liquids.    Patient has been received today and patient is being discharged to SNF at this time for continued care.  Will follow-up with his oncologist and PCP upon discharge from SNF.  Home felodipine was discontinued and amlodipine was continued which patient tolerated well.  Was also started on losartan with improved blood pressures.  No other changes made to home medications.    Total time spent on discharge: >30min      ---Of note, this documentation is completed using the Dragon Dictation system (voice recognition software). There may be spelling and/or grammatical errors that were not corrected prior to final submission.---    Efrain Kumari MD    DISCHARGE PHYSICAL EXAM     Last Recorded Vitals:  Vitals:    06/22/24 2002 06/23/24 0004 06/23/24 0407 06/23/24 0956   BP: 131/64 128/62 117/61 136/60   BP Location:    Right arm   Patient Position:    Sitting    Pulse: 75 73 62 64   Resp: 20 16 16 16   Temp: 36.8 °C (98.2 °F) 37.1 °C (98.8 °F) 36.2 °C (97.2 °F) 36 °C (96.8 °F)   TempSrc:    Temporal   SpO2: 95% 93% 96% 95%   Weight:       Height:         Physical Exam  Vitals reviewed.   Constitutional:       General: He is not in acute distress.     Appearance: Normal appearance. He is not ill-appearing.      Comments: Chronically frail in appearance   HENT:      Head: Normocephalic and atraumatic.   Eyes:      Extraocular Movements: Extraocular movements intact.      Conjunctiva/sclera: Conjunctivae normal.   Cardiovascular:      Rate and Rhythm: Normal rate and regular rhythm.      Pulses: Normal pulses.      Heart sounds: Normal heart sounds.   Pulmonary:      Effort: Pulmonary effort is normal.      Breath sounds: Normal breath sounds.   Abdominal:      General: Bowel sounds are normal.      Palpations: Abdomen is soft.      Tenderness: There is no abdominal tenderness. There is no guarding.   Musculoskeletal:         General: No swelling.      Cervical back: Normal range of motion and neck supple.      Comments: Range of motion of the lower extremities not examined today as patient was sitting in the chair, unremarkable upper extremity range of motion however.   Neurological:      General: No focal deficit present.      Mental Status: He is alert and oriented to person, place, and time. Mental status is at baseline.      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   Psychiatric:         Mood and Affect: Mood normal.         Behavior: Behavior normal.       DISCHARGE MEDICATIONS        Your medication list        START taking these medications        Instructions Last Dose Given Next Dose Due   amLODIPine 10 mg tablet  Commonly known as: Norvasc      Take 1 tablet (10 mg) by mouth once daily.       benzocaine-menthol lozenge  Commonly known as: Cepastat Sore Throat      Dissolve 1 lozenge in the mouth every 2 hours if needed for sore  throat.       losartan 50 mg tablet  Commonly known as: Cozaar  Start taking on: June 24, 2024      Take 1 tablet (50 mg) by mouth once daily.              CONTINUE taking these medications        Instructions Last Dose Given Next Dose Due   acetaminophen 650 mg ER tablet  Commonly known as: Tylenol 8 HOUR           aspirin 81 mg EC tablet           docusate sodium 50 mg capsule  Commonly known as: Colace           furosemide 20 mg tablet  Commonly known as: Lasix           melatonin 1 mg tablet           omeprazole 40 mg DR capsule  Commonly known as: PriLOSEC           polyethylene glycol 17 gram packet  Commonly known as: Glycolax, Miralax           sennosides 8.6 mg tablet  Commonly known as: Senokot           simvastatin 20 mg tablet  Commonly known as: Zocor                  STOP taking these medications      doxycycline 100 mg tablet  Commonly known as: Adoxa        felodipine ER 5 mg 24 hr tablet  Commonly known as: Plendil                  Where to Get Your Medications        Information about where to get these medications is not yet available    Ask your nurse or doctor about these medications  amLODIPine 10 mg tablet  benzocaine-menthol lozenge  losartan 50 mg tablet       OUTPATIENT FOLLOW-UP     No future appointments.

## 2024-06-23 NOTE — CARE PLAN
The patient's goals for the shift include get some rest    The clinical goals for the shift include Pt will have pain less than 4/10 during this shift.    Over the shift, the patient did request Tylenol for pain x1 dose given.    Problem: Fall/Injury  Goal: Verbalize understanding of personal risk factors for fall in the hospital  Outcome: Progressing     Problem: Pain  Goal: Turns in bed with improved pain control throughout the shift  Outcome: Progressing  Goal: Walks with improved pain control throughout the shift  Outcome: Progressing  Goal: Performs ADL's with improved pain control throughout shift  Outcome: Progressing     Problem: Skin  Goal: Participates in plan/prevention/treatment measures  Outcome: Progressing  Flowsheets (Taken 6/23/2024 1559)  Participates in plan/prevention/treatment measures:   Elevate heels   Increase activity/out of bed for meals

## 2024-06-24 ENCOUNTER — NURSING HOME VISIT (OUTPATIENT)
Dept: POST ACUTE CARE | Facility: EXTERNAL LOCATION | Age: 84
End: 2024-06-24
Payer: MEDICARE

## 2024-06-24 VITALS — DIASTOLIC BLOOD PRESSURE: 68 MMHG | HEART RATE: 78 BPM | SYSTOLIC BLOOD PRESSURE: 108 MMHG

## 2024-06-24 DIAGNOSIS — I48.21 PERMANENT ATRIAL FIBRILLATION (MULTI): Chronic | ICD-10-CM

## 2024-06-24 DIAGNOSIS — I50.32 CHRONIC DIASTOLIC CHF (CONGESTIVE HEART FAILURE) (MULTI): ICD-10-CM

## 2024-06-24 DIAGNOSIS — I10 ESSENTIAL HYPERTENSION: ICD-10-CM

## 2024-06-24 DIAGNOSIS — S32.591A PUBIC RAMUS FRACTURE, RIGHT, CLOSED, INITIAL ENCOUNTER (MULTI): Primary | ICD-10-CM
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
## 2024-06-24 DIAGNOSIS — Z95.818 PRESENCE OF WATCHMAN LEFT ATRIAL APPENDAGE CLOSURE DEVICE: Chronic | ICD-10-CM

## 2024-06-24 PROCEDURE — 99306 1ST NF CARE HIGH MDM 50: CPT | Performed by: INTERNAL MEDICINE

## 2024-06-24 ASSESSMENT — ENCOUNTER SYMPTOMS
VOMITING: 0
WEAKNESS: 1
COUGH: 0
ARTHRALGIAS: 1
NAUSEA: 0
FATIGUE: 1
ACTIVITY CHANGE: 1
AGITATION: 0
SHORTNESS OF BREATH: 0
APNEA: 0

## 2024-06-24 NOTE — LETTER
Patient: Yared Aquino  : 1940    Encounter Date: 2024    Subjective  Patient ID: Yared Aquino is a 84 y.o. male who is acute skilled care and presents for initial visit for skilled nursing.    84-year-old male patient has been admitted after the fall, he has a fracture of right pubic ramus, it was a nondisplaced fracture.  Patient was admitted, weightbearing as tolerated has been asked, because of fall and the fracture and debilitated state and not able to perform ADLs patient is admitted here for skilled nursing and rehabilitation.  Patient has history of CABG several years ago, patient has atrial fibrillation, patient has watchman's procedure done in the past, he has carcinoma of the larynx, it is not very much clear how it has been treated.  Patient is very much emaciated and wasted, he has extreme degree of hyperpigmentation on lower extremities, his general condition appears to be very debilitated and poor.  Patient was able to answer all my questions.  All the hospitalization related data and information imagings and the laboratories were reviewed, he has a chronic anemia, he does not have any renal impairment.  He has a pain and discomfort with range of motion at right lower extremity.  He has been admitted here for skilled nursing and rehabilitation and proper pain control and short-term stay here till he can get back to his baseline status of health.         Review of Systems   Constitutional:  Positive for activity change and fatigue.   Respiratory:  Negative for apnea, cough and shortness of breath.    Cardiovascular:  Negative for chest pain.   Gastrointestinal:  Negative for nausea and vomiting.   Musculoskeletal:  Positive for arthralgias and gait problem.   Skin: Negative.    Neurological:  Positive for weakness.   Psychiatric/Behavioral:  Negative for agitation and behavioral problems.        Objective  /68   Pulse 78     Physical Exam  Constitutional:       Appearance:  Normal appearance. He is cachectic. He is not ill-appearing or toxic-appearing.   HENT:      Head: Normocephalic.   Eyes:      Conjunctiva/sclera: Conjunctivae normal.   Cardiovascular:      Rate and Rhythm: Normal rate and regular rhythm.      Heart sounds: Murmur heard.   Pulmonary:      Breath sounds: Normal breath sounds.   Abdominal:      General: Abdomen is flat.      Palpations: Abdomen is soft.   Musculoskeletal:         General: Tenderness and signs of injury present. No swelling or deformity.      Cervical back: Neck supple. Tenderness present.   Skin:     General: Skin is warm and dry.      Findings: Bruising and erythema present.   Neurological:      Mental Status: He is oriented to person, place, and time. Mental status is at baseline.   Psychiatric:         Mood and Affect: Mood normal.         Assessment/Plan  Problem List Items Addressed This Visit             ICD-10-CM    Chronic diastolic CHF (congestive heart failure) (Multi) I50.32    Essential hypertension I10    Presence of Watchman left atrial appendage closure device (Chronic) Z95.818    Permanent atrial fibrillation (Multi) (Chronic) I48.21    Pubic ramus fracture, right, closed, initial encounter (Multi) - Primary S32.591A    Fall, initial encounter W19.XXXA   Patient is frail, emaciated, patient will be susceptible for falls unfortunately.  Patient is listed to be on amlodipine, aspirin, Lasix, losartan, omeprazole, simvastatin.  Patient's pain medications will be Tylenol only.  No narcotics are required.  Patient is able to converse meaningfully, patient needs to have a proper nutrition, patient needs to have a dietary evaluation.  Laboratories will be done as per our routine, all other routine safety precautions has to be taken as per the facility protocol.  Patient remains susceptible for bedsores, aspiration, nosocomial infections, patient remains susceptible for fall and more injury if not really on the feet or not assisted.  Patient  will be here for short-term skilled nursing or intermediate level of care, nursing staff was available at bedside, diastolic heart failure is not the acute issue, as patient has a watchman's anticoagulation has not been given, ventricular rate was not fast.  Patient's general condition remains poor but stable here for skilled nursing and rehabilitation.  Discussed with the patient and nursing staff periodic assessment and follow-up will be done.     Goals    None           Electronically Signed By: Oumar Olson MD   6/24/24  9:20 PM

## 2024-06-25 NOTE — PROGRESS NOTES
Subjective   Patient ID: Yared Aquino is a 84 y.o. male who is acute skilled care and presents for initial visit for skilled nursing.    84-year-old male patient has been admitted after the fall, he has a fracture of right pubic ramus, it was a nondisplaced fracture.  Patient was admitted, weightbearing as tolerated has been asked, because of fall and the fracture and debilitated state and not able to perform ADLs patient is admitted here for skilled nursing and rehabilitation.  Patient has history of CABG several years ago, patient has atrial fibrillation, patient has watchman's procedure done in the past, he has carcinoma of the larynx, it is not very much clear how it has been treated.  Patient is very much emaciated and wasted, he has extreme degree of hyperpigmentation on lower extremities, his general condition appears to be very debilitated and poor.  Patient was able to answer all my questions.  All the hospitalization related data and information imagings and the laboratories were reviewed, he has a chronic anemia, he does not have any renal impairment.  He has a pain and discomfort with range of motion at right lower extremity.  He has been admitted here for skilled nursing and rehabilitation and proper pain control and short-term stay here till he can get back to his baseline status of health.         Review of Systems   Constitutional:  Positive for activity change and fatigue.   Respiratory:  Negative for apnea, cough and shortness of breath.    Cardiovascular:  Negative for chest pain.   Gastrointestinal:  Negative for nausea and vomiting.   Musculoskeletal:  Positive for arthralgias and gait problem.   Skin: Negative.    Neurological:  Positive for weakness.   Psychiatric/Behavioral:  Negative for agitation and behavioral problems.        Objective   /68   Pulse 78     Physical Exam  Constitutional:       Appearance: Normal appearance. He is cachectic. He is not ill-appearing or  toxic-appearing.   HENT:      Head: Normocephalic.   Eyes:      Conjunctiva/sclera: Conjunctivae normal.   Cardiovascular:      Rate and Rhythm: Normal rate and regular rhythm.      Heart sounds: Murmur heard.   Pulmonary:      Breath sounds: Normal breath sounds.   Abdominal:      General: Abdomen is flat.      Palpations: Abdomen is soft.   Musculoskeletal:         General: Tenderness and signs of injury present. No swelling or deformity.      Cervical back: Neck supple. Tenderness present.   Skin:     General: Skin is warm and dry.      Findings: Bruising and erythema present.   Neurological:      Mental Status: He is oriented to person, place, and time. Mental status is at baseline.   Psychiatric:         Mood and Affect: Mood normal.         Assessment/Plan   Problem List Items Addressed This Visit             ICD-10-CM    Chronic diastolic CHF (congestive heart failure) (Multi) I50.32    Essential hypertension I10    Presence of Watchman left atrial appendage closure device (Chronic) Z95.818    Permanent atrial fibrillation (Multi) (Chronic) I48.21    Pubic ramus fracture, right, closed, initial encounter (Multi) - Primary S32.591A    Fall, initial encounter W19.XXXA   Patient is frail, emaciated, patient will be susceptible for falls unfortunately.  Patient is listed to be on amlodipine, aspirin, Lasix, losartan, omeprazole, simvastatin.  Patient's pain medications will be Tylenol only.  No narcotics are required.  Patient is able to converse meaningfully, patient needs to have a proper nutrition, patient needs to have a dietary evaluation.  Laboratories will be done as per our routine, all other routine safety precautions has to be taken as per the facility protocol.  Patient remains susceptible for bedsores, aspiration, nosocomial infections, patient remains susceptible for fall and more injury if not really on the feet or not assisted.  Patient will be here for short-term skilled nursing or intermediate  level of care, nursing staff was available at bedside, diastolic heart failure is not the acute issue, as patient has a watchman's anticoagulation has not been given, ventricular rate was not fast.  Patient's general condition remains poor but stable here for skilled nursing and rehabilitation.  Discussed with the patient and nursing staff periodic assessment and follow-up will be done.     Goals    None

## 2024-06-28 ENCOUNTER — NURSING HOME VISIT (OUTPATIENT)
Dept: POST ACUTE CARE | Facility: EXTERNAL LOCATION | Age: 84
End: 2024-06-28
Payer: MEDICARE

## 2024-06-28 DIAGNOSIS — S32.591A PUBIC RAMUS FRACTURE, RIGHT, CLOSED, INITIAL ENCOUNTER (MULTI): Primary | ICD-10-CM

## 2024-06-28 DIAGNOSIS — Z74.09 IMPAIRED FUNCTIONAL MOBILITY, BALANCE, GAIT, AND ENDURANCE: ICD-10-CM

## 2024-06-28 DIAGNOSIS — I10 ESSENTIAL HYPERTENSION: ICD-10-CM

## 2024-06-28 DIAGNOSIS — R52 PAIN: ICD-10-CM

## 2024-06-28 DIAGNOSIS — I50.32 CHRONIC DIASTOLIC CHF (CONGESTIVE HEART FAILURE) (MULTI): ICD-10-CM

## 2024-06-28 DIAGNOSIS — I48.21 PERMANENT ATRIAL FIBRILLATION (MULTI): Chronic | ICD-10-CM

## 2024-06-28 PROCEDURE — 99310 SBSQ NF CARE HIGH MDM 45: CPT | Performed by: PHYSICIAN ASSISTANT

## 2024-06-28 NOTE — LETTER
"Patient: Yared Aquino  : 1940    Encounter Date: 2024  Name: Yared Aquino  YOB: 1940    Chief complaint: Fall at home. Right pelvic rami fracture.    HPI: This is a 84 year old  male who has a medical history remarkable for polyneuropathy, gerd, vitamin D deficiency, MDD, BPH, anxiety disorder, anemia, chronic diastolic heart failure, atrial fibrillation with Watchman device, laryngeal cancer, and multiple falls.. Patient present to the ER for evaluation of mechanical fall at home. Radiographic studies demonstrated a acute R  pelvic rami fracture. He was seen by orthopedic service who did not recommend surgical intervention. He will have conservative management of pelvic fracture. Patient was discharged to SNF for rehab.    Patient seen and examined in his room. He is alert and able to provide medical history.    Fracture Follow-up: Patient here for follow up of a right other - pelvic  fracture. The injury occurred 6 days ago. He reports no problems with the injury, and no problems with swelling, numbness, or tingling in his R leg..     Review of systems:   ROS negative except were noted in HPI.    Code Status: DNR-CCA    /74   Pulse 78   Temp 36.6 °C (97.8 °F)   Resp 18   Ht 1.88 m (6' 2\")   Wt 70.3 kg (155 lb)   SpO2 98%   BMI 19.90 kg/m²      Physical Exam  Constitutional:       General: He is not in acute distress.  HENT:      Head: Normocephalic.      Nose: Nose normal.      Mouth/Throat:      Mouth: Mucous membranes are dry.   Eyes:      Extraocular Movements: Extraocular movements intact.      Pupils: Pupils are equal, round, and reactive to light.   Cardiovascular:      Rate and Rhythm: Normal rate.      Pulses: Normal pulses.   Pulmonary:      Effort: Pulmonary effort is normal.      Breath sounds: Normal breath sounds. No wheezing or rales.   Abdominal:      General: Bowel sounds are normal.      Palpations: Abdomen is soft.      " Tenderness: There is no abdominal tenderness. There is no guarding.   Genitourinary:     Comments: Voiding.  Musculoskeletal:         General: Tenderness present. Normal range of motion.      Cervical back: Normal range of motion.   Lymphadenopathy:      Cervical: No cervical adenopathy.   Skin:     General: Skin is warm and dry.      Capillary Refill: Capillary refill takes less than 2 seconds.   Neurological:      Mental Status: He is alert and oriented to person, place, and time. Mental status is at baseline.   Psychiatric:         Mood and Affect: Mood normal.         Behavior: Behavior normal.        Medications reviewed during visit at facility.  Tylenol 650 mg po q 4 hours prn  MOM 30 ml po daily prn  Atorvastatin 10 mg po daily  Aspirin 81 mg po daily  Losartan 50 mg po daily  Miralax 17 grams po daily  Melatonin 3 mg po at bedtime  Benzocaine-Menthol Mouth/Throat Lozenge 15-2.6 MG one lozenge q 2 hour prn  Amlodipine 10 mg po daily  Omeprazole 20 mg po daily  Furosemide 20  mg po daily  Senna one tablet po daily  Colace 100 mg po bid  Oxycodone 5 mg po daily  cy Hot Balm Extra Strength External Ointment 7.6-29 % apply to heals q shift.  Labs reviewed at facility:     Laboratory Service Report [id number]   Patient Name   KEYSHA MARQUEZ  Patient ID   [id number]  Age   84 Y  Gender   M  Order #      Ordering CHAGO Hahn  Patient Telephone #   N/A     1940  AKA      Client Order #   [id number]   Collection Date and Time   2024 05:58   Print Date and Time   2024 14:50  Account Information   [facility] [place]   [address] [place] [address]     Report Notes                  Test Results   Reference Perform  Unit  Value Site*             CBC and Differential  REPORTED 2024 09:24  White Blood Cell Count   7.55 k/uL 3.70-11.00    RBC L 3.57 m/uL 4.20-6.00    Hemoglobin L 11.6 g/dL 13.0-17.0    Hematocrit L 35.5 % 39.0-51.0    MCV   99.4 fL 80.0-100.0     MCH   32.5 pg 26.0-34.0    MCHC   32.7 g/dL 30.5-36.0    RDW-CV   13.2 % 11.5-15.0    Platelet Count   236 k/uL 150-400    MPV   9.5 fL 9.0-12.7    Neut%   64.9 %    Abs Neut   4.90 k/uL 1.45-7.50    Lymph%   18.5 %    Abs Lymph   1.40 k/uL 1.00-4.00    Mono%   11.0 %    Abs Mono   0.83 k/uL <0.87    Eosin%   4.2 %    Abs Eosin   0.32 k/uL <0.46    Baso%   0.7 %    Abs Baso   0.05 k/uL <0.11    Immature Gran %%   0.7 %    Abs Immature Gran   0.05 k/uL <0.10    NRBC   0.0 /100 WBC    Absolute nRBC   <0.01 k/uL <0.01    Diff Type   Auto               Comp Metabolic Panel  REPORTED 06/26/2024 09:45  Protein, Total   6.3 g/dL 6.3-8.0    Albumin L 3.3 g/dL 3.9-4.9    Calcium, Total   8.8 mg/dL 8.5-10.2    Bilirubin, Total   0.2 mg/dL 0.2-1.3    Alkaline Phosphatase H 137 U/L     AST   21 U/L 14-40    ALT   22 U/L 10-54    Glucose H 101 mg/dL 74-99  BUN H 38 mg/dL 9-24    Creatinine   1.02 mg/dL 0.73-1.22    Sodium   141 mmol/L 136-144    Potassium   4.4 mmol/L 3.7-5.1    Chloride   106 mmol/L     CO2   23 mmol/L 22-30    Anion Gap   12 mmol/L 8-15    Estimated Glomerular Filtration Rate   73 mL/min/1.73 meters squared >=60   Assessment/Plan   Problem List Items Addressed This Visit       Chronic diastolic CHF (congestive heart failure) (Multi)     Monitor weight. Review labs. Order CMP CBC with diff on Wednesdays.         Essential hypertension     Review BP readings. Continue with Amlodipine 10 mg po daily,and Losartan 50 mg po daily         Permanent atrial fibrillation (Multi) (Chronic)     Controlled rate. Has Watchman device.         Pubic ramus fracture, right, closed, initial encounter (Multi) - Primary     Schedule appointment with  Dr. Vidales (ortho) on 7/15/24. WBAT.          Impaired functional mobility, balance, gait, and endurance     PT and OT to assess and treat. Assist with all transfers.         Pain     Oxycodone 5 mg po daily, Tylenol 500 mg po q 4 hours prn.             Time:  I spent  45 minutes or greater with the patient. Greater than 50% of this time was spent in counseling and or coordination of care. The time includes prep time of reviewing vital signs, report from direct nursing staff and or therapists, hospital documentation, reviewing labs, radiographs, diagnostic tests and or consultations, time directly spent with the patient interviewing, examining, and education regarding diagnosis, treatments, and medications, as well as documentation in the electronic medical record, and reviewing the plan of care and any new orders with the patient, nursing staff and other staff directly related to the patients care.      Reji Crabtree PA-C       Electronically Signed By: Reji Crabtree PA-C   6/30/24  8:02 PM

## 2024-06-30 VITALS
OXYGEN SATURATION: 98 % | HEART RATE: 78 BPM | WEIGHT: 155 LBS | DIASTOLIC BLOOD PRESSURE: 74 MMHG | HEIGHT: 74 IN | RESPIRATION RATE: 18 BRPM | SYSTOLIC BLOOD PRESSURE: 128 MMHG | BODY MASS INDEX: 19.89 KG/M2 | TEMPERATURE: 97.8 F

## 2024-06-30 PROBLEM — R52 PAIN: Status: ACTIVE | Noted: 2024-06-30

## 2024-06-30 PROBLEM — Z74.09 IMPAIRED FUNCTIONAL MOBILITY, BALANCE, GAIT, AND ENDURANCE: Status: ACTIVE | Noted: 2024-06-30

## 2024-06-30 NOTE — PROGRESS NOTES
"6/28/2024  Name: Yared Aquino  YOB: 1940    Chief complaint: Fall at home. Right pelvic rami fracture.    HPI: This is a 84 year old  male who has a medical history remarkable for polyneuropathy, gerd, vitamin D deficiency, MDD, BPH, anxiety disorder, anemia, chronic diastolic heart failure, atrial fibrillation with Watchman device, laryngeal cancer, and multiple falls.. Patient present to the ER for evaluation of mechanical fall at home. Radiographic studies demonstrated a acute R  pelvic rami fracture. He was seen by orthopedic service who did not recommend surgical intervention. He will have conservative management of pelvic fracture. Patient was discharged to SNF for rehab.    Patient seen and examined in his room. He is alert and able to provide medical history.    Fracture Follow-up: Patient here for follow up of a right other - pelvic  fracture. The injury occurred 6 days ago. He reports no problems with the injury, and no problems with swelling, numbness, or tingling in his R leg..     Review of systems:   ROS negative except were noted in HPI.    Code Status: DNR-CCA    /74   Pulse 78   Temp 36.6 °C (97.8 °F)   Resp 18   Ht 1.88 m (6' 2\")   Wt 70.3 kg (155 lb)   SpO2 98%   BMI 19.90 kg/m²      Physical Exam  Constitutional:       General: He is not in acute distress.  HENT:      Head: Normocephalic.      Nose: Nose normal.      Mouth/Throat:      Mouth: Mucous membranes are dry.   Eyes:      Extraocular Movements: Extraocular movements intact.      Pupils: Pupils are equal, round, and reactive to light.   Cardiovascular:      Rate and Rhythm: Normal rate.      Pulses: Normal pulses.   Pulmonary:      Effort: Pulmonary effort is normal.      Breath sounds: Normal breath sounds. No wheezing or rales.   Abdominal:      General: Bowel sounds are normal.      Palpations: Abdomen is soft.      Tenderness: There is no abdominal tenderness. There is no guarding. "   Genitourinary:     Comments: Voiding.  Musculoskeletal:         General: Tenderness present. Normal range of motion.      Cervical back: Normal range of motion.   Lymphadenopathy:      Cervical: No cervical adenopathy.   Skin:     General: Skin is warm and dry.      Capillary Refill: Capillary refill takes less than 2 seconds.   Neurological:      Mental Status: He is alert and oriented to person, place, and time. Mental status is at baseline.   Psychiatric:         Mood and Affect: Mood normal.         Behavior: Behavior normal.        Medications reviewed during visit at facility.  Tylenol 650 mg po q 4 hours prn  MOM 30 ml po daily prn  Atorvastatin 10 mg po daily  Aspirin 81 mg po daily  Losartan 50 mg po daily  Miralax 17 grams po daily  Melatonin 3 mg po at bedtime  Benzocaine-Menthol Mouth/Throat Lozenge 15-2.6 MG one lozenge q 2 hour prn  Amlodipine 10 mg po daily  Omeprazole 20 mg po daily  Furosemide 20  mg po daily  Senna one tablet po daily  Colace 100 mg po bid  Oxycodone 5 mg po daily  cy Hot Balm Extra Strength External Ointment 7.6-29 % apply to heals q shift.  Labs reviewed at facility:     Laboratory Service Report [id number]   Patient Name   KEYSHA MARQUEZ  Patient ID   [id number]  Age   84 Y  Gender   M  Order #      Ordering Phys   CHAGO SANTACRUZ  Patient Telephone #   N/A     1940  AKA      Client Order #   [id number]   Collection Date and Time   2024 05:58   Print Date and Time   2024 14:50  Account Information   [facility] [place]   [address] [place] [address]     Report Notes                  Test Results   Reference Perform  Unit  Value Site*             CBC and Differential  REPORTED 2024 09:24  White Blood Cell Count   7.55 k/uL 3.70-11.00    RBC L 3.57 m/uL 4.20-6.00    Hemoglobin L 11.6 g/dL 13.0-17.0    Hematocrit L 35.5 % 39.0-51.0    MCV   99.4 fL 80.0-100.0    MCH   32.5 pg 26.0-34.0    MCHC   32.7 g/dL 30.5-36.0    RDW-CV    13.2 % 11.5-15.0    Platelet Count   236 k/uL 150-400    MPV   9.5 fL 9.0-12.7    Neut%   64.9 %    Abs Neut   4.90 k/uL 1.45-7.50    Lymph%   18.5 %    Abs Lymph   1.40 k/uL 1.00-4.00    Mono%   11.0 %    Abs Mono   0.83 k/uL <0.87    Eosin%   4.2 %    Abs Eosin   0.32 k/uL <0.46    Baso%   0.7 %    Abs Baso   0.05 k/uL <0.11    Immature Gran %%   0.7 %    Abs Immature Gran   0.05 k/uL <0.10    NRBC   0.0 /100 WBC    Absolute nRBC   <0.01 k/uL <0.01    Diff Type   Auto               Comp Metabolic Panel  REPORTED 06/26/2024 09:45  Protein, Total   6.3 g/dL 6.3-8.0    Albumin L 3.3 g/dL 3.9-4.9    Calcium, Total   8.8 mg/dL 8.5-10.2    Bilirubin, Total   0.2 mg/dL 0.2-1.3    Alkaline Phosphatase H 137 U/L     AST   21 U/L 14-40    ALT   22 U/L 10-54    Glucose H 101 mg/dL 74-99  BUN H 38 mg/dL 9-24    Creatinine   1.02 mg/dL 0.73-1.22    Sodium   141 mmol/L 136-144    Potassium   4.4 mmol/L 3.7-5.1    Chloride   106 mmol/L     CO2   23 mmol/L 22-30    Anion Gap   12 mmol/L 8-15    Estimated Glomerular Filtration Rate   73 mL/min/1.73 meters squared >=60   Assessment/Plan    Problem List Items Addressed This Visit       Chronic diastolic CHF (congestive heart failure) (Multi)     Monitor weight. Review labs. Order CMP CBC with diff on Wednesdays.         Essential hypertension     Review BP readings. Continue with Amlodipine 10 mg po daily,and Losartan 50 mg po daily         Permanent atrial fibrillation (Multi) (Chronic)     Controlled rate. Has Watchman device.         Pubic ramus fracture, right, closed, initial encounter (Multi) - Primary     Schedule appointment with  Dr. Vidales (ortho) on 7/15/24. WBAT.          Impaired functional mobility, balance, gait, and endurance     PT and OT to assess and treat. Assist with all transfers.         Pain     Oxycodone 5 mg po daily, Tylenol 500 mg po q 4 hours prn.             Time:  I spent 45 minutes or greater with the patient. Greater than 50% of this  time was spent in counseling and or coordination of care. The time includes prep time of reviewing vital signs, report from direct nursing staff and or therapists, hospital documentation, reviewing labs, radiographs, diagnostic tests and or consultations, time directly spent with the patient interviewing, examining, and education regarding diagnosis, treatments, and medications, as well as documentation in the electronic medical record, and reviewing the plan of care and any new orders with the patient, nursing staff and other staff directly related to the patients care.      Reji Crabtree PA-C

## 2024-07-03 ENCOUNTER — NURSING HOME VISIT (OUTPATIENT)
Dept: POST ACUTE CARE | Facility: EXTERNAL LOCATION | Age: 84
End: 2024-07-03
Payer: MEDICARE

## 2024-07-03 DIAGNOSIS — Z95.818 PRESENCE OF WATCHMAN LEFT ATRIAL APPENDAGE CLOSURE DEVICE: ICD-10-CM

## 2024-07-03 DIAGNOSIS — I10 ESSENTIAL HYPERTENSION: ICD-10-CM

## 2024-07-03 DIAGNOSIS — I48.21 PERMANENT ATRIAL FIBRILLATION (MULTI): ICD-10-CM

## 2024-07-03 DIAGNOSIS — S32.591A PUBIC RAMUS FRACTURE, RIGHT, CLOSED, INITIAL ENCOUNTER (MULTI): Primary | ICD-10-CM

## 2024-07-03 DIAGNOSIS — I50.32 CHRONIC DIASTOLIC CHF (CONGESTIVE HEART FAILURE) (MULTI): ICD-10-CM

## 2024-07-03 PROCEDURE — 99308 SBSQ NF CARE LOW MDM 20: CPT | Performed by: INTERNAL MEDICINE

## 2024-07-03 NOTE — LETTER
Patient: Yared Aquino  : 1940    Encounter Date: 2024    Subjective  Patient ID: Yared Aquino is a 84 y.o. male who is acute skilled care being seen and evaluated for multiple medical problems.    Patient is doing better, patient was admitted with a pelvic fracture.  Laboratories has been unremarkable hemoglobin is 11.6.  Patient was lying in the bed at the time of my evaluation today consuming his meals no swallowing difficulty noticed.  Patient remains on amlodipine, furosemide, aspirin, losartan, atorvastatin, omeprazole.  I see a walker at the bedside that means he has been trying his best to be mobilized and ambulatory.  There was a message from patient's spouse about to be kept on doxycycline on ongoing basis message was taken and orders were put in.  Patient is frail, he has history of atrial fibrillation, he has a frail physical health, his nutrition is also poor.         Review of Systems   Constitutional:  Positive for fatigue. Negative for activity change.   Respiratory:  Negative for apnea, cough, choking and shortness of breath.    Cardiovascular:  Negative for chest pain and leg swelling.   Gastrointestinal:  Positive for constipation. Negative for abdominal pain and diarrhea.   Musculoskeletal:  Positive for arthralgias and gait problem.   Skin:  Negative for wound.   Neurological:  Positive for weakness.   Hematological:  Bruises/bleeds easily.   Psychiatric/Behavioral:  The patient is not nervous/anxious.        Objective  /74   Pulse 72   Wt 70.8 kg (156 lb)   BMI 20.03 kg/m²     Physical Exam  Vitals reviewed.   Constitutional:       Appearance: Normal appearance. He is normal weight. He is not ill-appearing or toxic-appearing.      Comments: Weak appearing   HENT:      Head: Normocephalic and atraumatic.   Eyes:      Conjunctiva/sclera: Conjunctivae normal.   Cardiovascular:      Rate and Rhythm: Normal rate. Rhythm irregular.      Pulses: Normal pulses.   Pulmonary:       Effort: Pulmonary effort is normal.      Breath sounds: Normal breath sounds. No rales.   Abdominal:      Palpations: Abdomen is soft.      Tenderness: There is abdominal tenderness.   Musculoskeletal:         General: Tenderness present.      Cervical back: Neck supple.   Skin:     General: Skin is warm and dry.      Findings: Bruising present.   Neurological:      Mental Status: He is oriented to person, place, and time. Mental status is at baseline.   Psychiatric:         Behavior: Behavior normal.         Assessment/Plan  Problem List Items Addressed This Visit             ICD-10-CM    Chronic diastolic CHF (congestive heart failure) (Multi) I50.32    Essential hypertension I10    Presence of Watchman left atrial appendage closure device (Chronic) Z95.818    Permanent atrial fibrillation (Multi) (Chronic) I48.21    Pubic ramus fracture, right, closed, initial encounter (Multi) - Primary S32.591A   Continue skilled nursing and rehabilitation, slow and progressive progress.  As watchman's anticoagulation is not warranted, still more calorie consumption and extra nutritional supplement could be beneficial although it will take time.  Could have osteopenic osteoporotic bones, has a pelvic fracture which will take some time to heal, BP readings are stable, diuretics has been kept, no angina, no nosocomial infections, no fall, no confusion.  Malignancy has not been the acute problem, previous malignancy does not have been in the active problem list.  No discharge planning yet, it is a diastolic CHF.  Pain control is reasonable.  Discussed with nursing staff     Goals    None           Electronically Signed By: Oumar Olson MD   7/4/24 12:32 PM

## 2024-07-04 VITALS
SYSTOLIC BLOOD PRESSURE: 118 MMHG | HEART RATE: 72 BPM | DIASTOLIC BLOOD PRESSURE: 74 MMHG | BODY MASS INDEX: 20.03 KG/M2 | WEIGHT: 156 LBS

## 2024-07-04 ASSESSMENT — ENCOUNTER SYMPTOMS
CHOKING: 0
APNEA: 0
CONSTIPATION: 1
WOUND: 0
SHORTNESS OF BREATH: 0
DIARRHEA: 0
ACTIVITY CHANGE: 0
COUGH: 0
NERVOUS/ANXIOUS: 0
ABDOMINAL PAIN: 0
WEAKNESS: 1
FATIGUE: 1
ARTHRALGIAS: 1
BRUISES/BLEEDS EASILY: 1

## 2024-07-04 NOTE — PROGRESS NOTES
Subjective   Patient ID: Yared Aquino is a 84 y.o. male who is acute skilled care being seen and evaluated for multiple medical problems.    Patient is doing better, patient was admitted with a pelvic fracture.  Laboratories has been unremarkable hemoglobin is 11.6.  Patient was lying in the bed at the time of my evaluation today consuming his meals no swallowing difficulty noticed.  Patient remains on amlodipine, furosemide, aspirin, losartan, atorvastatin, omeprazole.  I see a walker at the bedside that means he has been trying his best to be mobilized and ambulatory.  There was a message from patient's spouse about to be kept on doxycycline on ongoing basis message was taken and orders were put in.  Patient is frail, he has history of atrial fibrillation, he has a frail physical health, his nutrition is also poor.         Review of Systems   Constitutional:  Positive for fatigue. Negative for activity change.   Respiratory:  Negative for apnea, cough, choking and shortness of breath.    Cardiovascular:  Negative for chest pain and leg swelling.   Gastrointestinal:  Positive for constipation. Negative for abdominal pain and diarrhea.   Musculoskeletal:  Positive for arthralgias and gait problem.   Skin:  Negative for wound.   Neurological:  Positive for weakness.   Hematological:  Bruises/bleeds easily.   Psychiatric/Behavioral:  The patient is not nervous/anxious.        Objective   /74   Pulse 72   Wt 70.8 kg (156 lb)   BMI 20.03 kg/m²     Physical Exam  Vitals reviewed.   Constitutional:       Appearance: Normal appearance. He is normal weight. He is not ill-appearing or toxic-appearing.      Comments: Weak appearing   HENT:      Head: Normocephalic and atraumatic.   Eyes:      Conjunctiva/sclera: Conjunctivae normal.   Cardiovascular:      Rate and Rhythm: Normal rate. Rhythm irregular.      Pulses: Normal pulses.   Pulmonary:      Effort: Pulmonary effort is normal.      Breath sounds: Normal  breath sounds. No rales.   Abdominal:      Palpations: Abdomen is soft.      Tenderness: There is abdominal tenderness.   Musculoskeletal:         General: Tenderness present.      Cervical back: Neck supple.   Skin:     General: Skin is warm and dry.      Findings: Bruising present.   Neurological:      Mental Status: He is oriented to person, place, and time. Mental status is at baseline.   Psychiatric:         Behavior: Behavior normal.         Assessment/Plan   Problem List Items Addressed This Visit             ICD-10-CM    Chronic diastolic CHF (congestive heart failure) (Multi) I50.32    Essential hypertension I10    Presence of Watchman left atrial appendage closure device (Chronic) Z95.818    Permanent atrial fibrillation (Multi) (Chronic) I48.21    Pubic ramus fracture, right, closed, initial encounter (Multi) - Primary S32.591A   Continue skilled nursing and rehabilitation, slow and progressive progress.  As watchman's anticoagulation is not warranted, still more calorie consumption and extra nutritional supplement could be beneficial although it will take time.  Could have osteopenic osteoporotic bones, has a pelvic fracture which will take some time to heal, BP readings are stable, diuretics has been kept, no angina, no nosocomial infections, no fall, no confusion.  Malignancy has not been the acute problem, previous malignancy does not have been in the active problem list.  No discharge planning yet, it is a diastolic CHF.  Pain control is reasonable.  Discussed with nursing staff     Goals    None

## 2024-07-05 ENCOUNTER — NURSING HOME VISIT (OUTPATIENT)
Dept: POST ACUTE CARE | Facility: EXTERNAL LOCATION | Age: 84
End: 2024-07-05
Payer: MEDICARE

## 2024-07-05 DIAGNOSIS — K21.9 GASTROESOPHAGEAL REFLUX DISEASE WITHOUT ESOPHAGITIS: ICD-10-CM

## 2024-07-05 DIAGNOSIS — N18.30 BENIGN HYPERTENSION WITH CKD (CHRONIC KIDNEY DISEASE) STAGE III (MULTI): ICD-10-CM

## 2024-07-05 DIAGNOSIS — Z74.09 IMPAIRED FUNCTIONAL MOBILITY, BALANCE, GAIT, AND ENDURANCE: ICD-10-CM

## 2024-07-05 DIAGNOSIS — S32.591A PUBIC RAMUS FRACTURE, RIGHT, CLOSED, INITIAL ENCOUNTER (MULTI): Primary | ICD-10-CM

## 2024-07-05 DIAGNOSIS — I12.9 BENIGN HYPERTENSION WITH CKD (CHRONIC KIDNEY DISEASE) STAGE III (MULTI): ICD-10-CM

## 2024-07-05 PROCEDURE — 99308 SBSQ NF CARE LOW MDM 20: CPT | Performed by: PHYSICIAN ASSISTANT

## 2024-07-05 NOTE — LETTER
"Patient: Yared Aquino  : 1940    Encounter Date: 2024  Name: Yared Aquino  YOB: 1940    Chief complaint: Impaired mobility. Pelvic rami fracture.    HPI: Patient seen and examined in his room. He is seated at time of exam. Review of physical therapy notes indicate he is walking >25 feet with walker during supervised ambulation. Denies fever, chills, exertional dyspnea, chest pain, or palpitations. He is comfortable on current pain medications.     Extremity Weakness  This is a new problem. The current episode started 1 to 4 weeks ago. The problem has been gradually improving. Pertinent negatives include no abdominal pain, arthralgias, change in bowel habit, chest pain, chills, fever, headaches, joint swelling or urinary symptoms. The symptoms are aggravated by standing. He has tried relaxation, oral narcotics and rest for the symptoms. The treatment provided moderate relief.     Review of systems:   ROS negative except were noted in HPI.    Code Status: DNR-CCA    /62   Pulse 58   Temp 36.3 °C (97.3 °F)   Resp 16   Ht 1.88 m (6' 2\")   Wt 70.8 kg (156 lb)   SpO2 95%   BMI 20.03 kg/m²      Physical Exam  Constitutional:       General: He is not in acute distress.  HENT:      Head: Normocephalic.      Nose: Nose normal.      Mouth/Throat:      Mouth: Mucous membranes are dry.   Eyes:      Extraocular Movements: Extraocular movements intact.      Pupils: Pupils are equal, round, and reactive to light.   Cardiovascular:      Rate and Rhythm: Normal rate.      Pulses: Normal pulses.   Pulmonary:      Effort: Pulmonary effort is normal.      Breath sounds: Normal breath sounds. No wheezing or rales.   Abdominal:      General: Bowel sounds are normal.      Palpations: Abdomen is soft.      Tenderness: There is no abdominal tenderness. There is no guarding.   Genitourinary:     Comments: Voiding.  Musculoskeletal:         General: Tenderness present. Normal range of " motion.      Cervical back: Normal range of motion.   Lymphadenopathy:      Cervical: No cervical adenopathy.   Skin:     General: Skin is warm and dry.      Capillary Refill: Capillary refill takes less than 2 seconds.   Neurological:      Mental Status: He is alert and oriented to person, place, and time. Mental status is at baseline.   Psychiatric:         Mood and Affect: Mood normal.         Behavior: Behavior normal.     Medications reviewed during visit at facility.  Tylenol 650 mg po q 4 hours prn  MOM 30 ml po daily prn  Atorvastatin 10 mg po daily  Aspirin 81 mg po daily  Losartan 50 mg po daily  Miralax 17 grams po daily  Melatonin 3 mg po at bedtime  Benzocaine-Menthol Mouth/Throat Lozenge 15-2.6 MG one lozenge q 2 hour prn  Amlodipine 10 mg po daily  Omeprazole 20 mg po daily  Furosemide 20  mg po daily  Senna one tablet po daily  Colace 100 mg po bid  Oxycodone 5 mg po daily  cy Hot Balm Extra Strength External Ointment 7.6-29 % apply to heals q shift.  Labs reviewed at facility:     Laboratory Service Report [id number]   Patient Name   KEYSHA MARQUEZ  Patient ID   [id number]  Age   84 Y  Gender   M  Order #      Ordering Phys   CHAGO SANTACRUZ  Patient Telephone #   N/A     1940  AKA      Client Order #   [id number]   Collection Date and Time   2024 04:55   Print Date and Time   2024 20:38  Account Information   [facility]   [address] [place] [address]     Report Notes                  Test Results   Reference Perform  Unit  Value Site*             CBC and Differential  REPORTED 2024 09:11  White Blood Cell Count   7.46 k/uL 3.70-11.00    RBC L 3.58 m/uL 4.20-6.00    Hemoglobin L 11.5 g/dL 13.0-17.0    Hematocrit L 35.4 % 39.0-51.0    MCV   98.9 fL 80.0-100.0    MCH   32.1 pg 26.0-34.0    MCHC   32.5 g/dL 30.5-36.0    RDW-CV   13.1 % 11.5-15.0    Platelet Count   340 k/uL 150-400    MPV   9.3 fL 9.0-12.7    Neut%   60.4 %    Abs Neut    4.51 k/uL 1.45-7.50    Lymph%   23.6 %    Abs Lymph   1.76 k/uL 1.00-4.00    Mono%   9.7 %    Abs Mono   0.72 k/uL <0.87    Eosin%   5.1 %    Abs Eosin   0.38 k/uL <0.46    Baso%   0.7 %    Abs Baso   0.05 k/uL <0.11    Immature Gran %%   0.5 %    Abs Immature Gran   0.04 k/uL <0.10    NRBC   0.0 /100 WBC    Absolute nRBC   <0.01 k/uL <0.01    Diff Type   Auto               Comp Metabolic Panel  REPORTED 07/03/2024 09:18  Protein, Total   6.6 g/dL 6.3-8.0    Albumin L 3.6 g/dL 3.9-4.9    Calcium, Total   8.7 mg/dL 8.5-10.2    Bilirubin, Total   0.2 mg/dL 0.2-1.3    Alkaline Phosphatase H 175 U/L     AST L 10 U/L 14-40    ALT   10 U/L 10-54    Glucose   90 mg/dL 74-99  BUN H 49 mg/dL 9-24    Creatinine H 1.41 mg/dL 0.73-1.22    Sodium   140 mmol/L 136-144    Potassium   4.3 mmol/L 3.7-5.1    Chloride   104 mmol/L     CO2   24 mmol/L 22-30    Anion Gap   12 mmol/L 8-15    Estimated Glomerular Filtration Rate L 49 mL/min/1.73 meters squared >=60    Assessment/Plan   Problem List Items Addressed This Visit       Gastroesophageal reflux disease without esophagitis     No complaint of GI distress. Omeprazole 20 mg po daily         Benign hypertension with CKD (chronic kidney disease) stage III (Multi)     Review BP readings. Stable on Amlodipine 10 mg po daily, and Losartan 50 mg po daily         Pubic ramus fracture, right, closed, initial encounter (Multi) - Primary     Routine healing. Medicate for pain. Follow up appointment with Dr. Sutherland on 7/15/2024.         Impaired functional mobility, balance, gait, and endurance     Making progress. Now walking > 25 feet. Review physical and occupational therapy notes.            Time:    Reji Crabtree PA-C       Electronically Signed By: Reji Crabtree PA-C   7/7/24  8:46 PM

## 2024-07-07 VITALS
HEIGHT: 74 IN | BODY MASS INDEX: 20.02 KG/M2 | OXYGEN SATURATION: 95 % | RESPIRATION RATE: 16 BRPM | TEMPERATURE: 97.3 F | SYSTOLIC BLOOD PRESSURE: 112 MMHG | DIASTOLIC BLOOD PRESSURE: 62 MMHG | HEART RATE: 58 BPM | WEIGHT: 156 LBS

## 2024-07-07 ASSESSMENT — ENCOUNTER SYMPTOMS
ABDOMINAL PAIN: 0
CHANGE IN BOWEL HABIT: 0
ARTHRALGIAS: 0
CHILLS: 0
EXTREMITY WEAKNESS: 1
JOINT SWELLING: 0
FEVER: 0
HEADACHES: 0

## 2024-07-08 NOTE — PROGRESS NOTES
"7/5/2024  Name: Yared Aquino  YOB: 1940    Chief complaint: Impaired mobility. Pelvic rami fracture.    HPI: Patient seen and examined in his room. He is seated at time of exam. Review of physical therapy notes indicate he is walking >25 feet with walker during supervised ambulation. Denies fever, chills, exertional dyspnea, chest pain, or palpitations. He is comfortable on current pain medications.     Extremity Weakness  This is a new problem. The current episode started 1 to 4 weeks ago. The problem has been gradually improving. Pertinent negatives include no abdominal pain, arthralgias, change in bowel habit, chest pain, chills, fever, headaches, joint swelling or urinary symptoms. The symptoms are aggravated by standing. He has tried relaxation, oral narcotics and rest for the symptoms. The treatment provided moderate relief.     Review of systems:   ROS negative except were noted in HPI.    Code Status: DNR-CCA    /62   Pulse 58   Temp 36.3 °C (97.3 °F)   Resp 16   Ht 1.88 m (6' 2\")   Wt 70.8 kg (156 lb)   SpO2 95%   BMI 20.03 kg/m²      Physical Exam  Constitutional:       General: He is not in acute distress.  HENT:      Head: Normocephalic.      Nose: Nose normal.      Mouth/Throat:      Mouth: Mucous membranes are dry.   Eyes:      Extraocular Movements: Extraocular movements intact.      Pupils: Pupils are equal, round, and reactive to light.   Cardiovascular:      Rate and Rhythm: Normal rate.      Pulses: Normal pulses.   Pulmonary:      Effort: Pulmonary effort is normal.      Breath sounds: Normal breath sounds. No wheezing or rales.   Abdominal:      General: Bowel sounds are normal.      Palpations: Abdomen is soft.      Tenderness: There is no abdominal tenderness. There is no guarding.   Genitourinary:     Comments: Voiding.  Musculoskeletal:         General: Tenderness present. Normal range of motion.      Cervical back: Normal range of motion.   Lymphadenopathy: "      Cervical: No cervical adenopathy.   Skin:     General: Skin is warm and dry.      Capillary Refill: Capillary refill takes less than 2 seconds.   Neurological:      Mental Status: He is alert and oriented to person, place, and time. Mental status is at baseline.   Psychiatric:         Mood and Affect: Mood normal.         Behavior: Behavior normal.     Medications reviewed during visit at facility.  Tylenol 650 mg po q 4 hours prn  MOM 30 ml po daily prn  Atorvastatin 10 mg po daily  Aspirin 81 mg po daily  Losartan 50 mg po daily  Miralax 17 grams po daily  Melatonin 3 mg po at bedtime  Benzocaine-Menthol Mouth/Throat Lozenge 15-2.6 MG one lozenge q 2 hour prn  Amlodipine 10 mg po daily  Omeprazole 20 mg po daily  Furosemide 20  mg po daily  Senna one tablet po daily  Colace 100 mg po bid  Oxycodone 5 mg po daily  cy Hot Balm Extra Strength External Ointment 7.6-29 % apply to heals q shift.  Labs reviewed at facility:     Laboratory Service Report [phone]   Patient Name   KEYSHA MARQUEZ  Patient ID   [id number]  Age   84 Y  Gender   M  Order #      Ordering Phys   CHAGO SANTACRUZ  Patient Telephone #   N/A     1940  AKA      Client Order #   [id number]   Collection Date and Time   2024 04:55   Print Date and Time   2024 20:38  Account Information   [facility] [place]   [address] [place] [address]     Report Notes                  Test Results   Reference Perform  Unit  Value Site*             CBC and Differential  REPORTED 2024 09:11  White Blood Cell Count   7.46 k/uL 3.70-11.00    RBC L 3.58 m/uL 4.20-6.00    Hemoglobin L 11.5 g/dL 13.0-17.0    Hematocrit L 35.4 % 39.0-51.0    MCV   98.9 fL 80.0-100.0    MCH   32.1 pg 26.0-34.0    MCHC   32.5 g/dL 30.5-36.0    RDW-CV   13.1 % 11.5-15.0    Platelet Count   340 k/uL 150-400    MPV   9.3 fL 9.0-12.7    Neut%   60.4 %    Abs Neut   4.51 k/uL 1.45-7.50    Lymph%   23.6 %    Abs Lymph   1.76 k/uL 1.00-4.00     Mono%   9.7 %    Abs Mono   0.72 k/uL <0.87    Eosin%   5.1 %    Abs Eosin   0.38 k/uL <0.46    Baso%   0.7 %    Abs Baso   0.05 k/uL <0.11    Immature Gran %%   0.5 %    Abs Immature Gran   0.04 k/uL <0.10    NRBC   0.0 /100 WBC    Absolute nRBC   <0.01 k/uL <0.01    Diff Type   Auto               Comp Metabolic Panel  REPORTED 07/03/2024 09:18  Protein, Total   6.6 g/dL 6.3-8.0    Albumin L 3.6 g/dL 3.9-4.9    Calcium, Total   8.7 mg/dL 8.5-10.2    Bilirubin, Total   0.2 mg/dL 0.2-1.3    Alkaline Phosphatase H 175 U/L     AST L 10 U/L 14-40    ALT   10 U/L 10-54    Glucose   90 mg/dL 74-99  BUN H 49 mg/dL 9-24    Creatinine H 1.41 mg/dL 0.73-1.22    Sodium   140 mmol/L 136-144    Potassium   4.3 mmol/L 3.7-5.1    Chloride   104 mmol/L     CO2   24 mmol/L 22-30    Anion Gap   12 mmol/L 8-15    Estimated Glomerular Filtration Rate L 49 mL/min/1.73 meters squared >=60    Assessment/Plan    Problem List Items Addressed This Visit       Gastroesophageal reflux disease without esophagitis     No complaint of GI distress. Omeprazole 20 mg po daily         Benign hypertension with CKD (chronic kidney disease) stage III (Multi)     Review BP readings. Stable on Amlodipine 10 mg po daily, and Losartan 50 mg po daily         Pubic ramus fracture, right, closed, initial encounter (Multi) - Primary     Routine healing. Medicate for pain. Follow up appointment with Dr. Sutherland on 7/15/2024.         Impaired functional mobility, balance, gait, and endurance     Making progress. Now walking > 25 feet. Review physical and occupational therapy notes.            Time:    Reji Crabtree PA-C

## 2024-07-14 ENCOUNTER — NURSING HOME VISIT (OUTPATIENT)
Dept: POST ACUTE CARE | Facility: EXTERNAL LOCATION | Age: 84
End: 2024-07-14
Payer: MEDICARE

## 2024-07-14 VITALS — SYSTOLIC BLOOD PRESSURE: 132 MMHG | DIASTOLIC BLOOD PRESSURE: 78 MMHG | HEART RATE: 68 BPM

## 2024-07-14 DIAGNOSIS — S32.591A PUBIC RAMUS FRACTURE, RIGHT, CLOSED, INITIAL ENCOUNTER (MULTI): Primary | ICD-10-CM

## 2024-07-14 DIAGNOSIS — I50.32 CHRONIC DIASTOLIC CHF (CONGESTIVE HEART FAILURE) (MULTI): ICD-10-CM

## 2024-07-14 DIAGNOSIS — I10 ESSENTIAL HYPERTENSION: ICD-10-CM

## 2024-07-14 DIAGNOSIS — Z95.818 PRESENCE OF WATCHMAN LEFT ATRIAL APPENDAGE CLOSURE DEVICE: ICD-10-CM

## 2024-07-14 DIAGNOSIS — I48.21 PERMANENT ATRIAL FIBRILLATION (MULTI): ICD-10-CM

## 2024-07-14 PROCEDURE — 99308 SBSQ NF CARE LOW MDM 20: CPT | Performed by: INTERNAL MEDICINE

## 2024-07-14 ASSESSMENT — ENCOUNTER SYMPTOMS
ABDOMINAL PAIN: 0
ARTHRALGIAS: 1
COUGH: 0
VOMITING: 0
WOUND: 0
APNEA: 0
ACTIVITY CHANGE: 0
WEAKNESS: 1
NAUSEA: 0
SHORTNESS OF BREATH: 0
DIAPHORESIS: 0
FATIGUE: 0

## 2024-07-14 NOTE — LETTER
Patient: Yared Aquino  : 1940    Encounter Date: 2024    Subjective  Patient ID: Yared Aquino is a 84 y.o. male who is acute skilled care being seen and evaluated for multiple medical problems.    Patient is somewhat better, hemoglobin is 11.4, creatinine is 1.16, mobility has improved, ambulation is improved, originally admitted for fracture of pelvis, fracture of pelvis takes time to improve.  Patient has atrial fibrillation with watchman's procedure, no fluid overload, remains on Lasix losartan.  Remains on amlodipine.  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  Appetite has been stable, no pain or discomfort out of ordinary, daily routine has been well-kept and maintained, family member is not at bedside.         Review of Systems   Constitutional:  Negative for activity change, diaphoresis and fatigue.   HENT:  Negative for drooling.    Respiratory:  Negative for apnea, cough and shortness of breath.    Cardiovascular:  Negative for chest pain.   Gastrointestinal:  Negative for abdominal pain, nausea and vomiting.   Musculoskeletal:  Positive for arthralgias and gait problem.   Skin:  Negative for wound.   Neurological:  Positive for weakness.       Objective  /78   Pulse 68     Physical Exam  Constitutional:       Appearance: Normal appearance. He is normal weight. He is not ill-appearing or toxic-appearing.      Comments: Weak appearing   HENT:      Head: Normocephalic and atraumatic.   Eyes:      Conjunctiva/sclera: Conjunctivae normal.   Cardiovascular:      Rate and Rhythm: Normal rate. Rhythm irregular.      Pulses: Normal pulses.   Pulmonary:      Effort: Pulmonary effort is normal.      Breath sounds: Normal breath sounds. No rales.   Abdominal:      Palpations: Abdomen is soft.      Tenderness: There is abdominal tenderness.   Musculoskeletal:         General: Tenderness present.      Cervical back: Neck supple.   Skin:      General: Skin is warm and dry.      Findings: Bruising present.   Neurological:      Mental Status: He is oriented to person, place, and time.   Assessment/Plan  Problem List Items Addressed This Visit             ICD-10-CM    Chronic diastolic CHF (congestive heart failure) (Multi) I50.32    Essential hypertension I10    Presence of Watchman left atrial appendage closure device (Chronic) Z95.818    Permanent atrial fibrillation (Multi) (Chronic) I48.21    Pubic ramus fracture, right, closed, initial encounter (Multi) - Primary S32.591A   Patient is doing better, remains DNR, mentation and alertness is better, pain control is reasonable, chronic atrial fibrillation remains.  Lasix to be continued no fluid overload, BP readings has been reviewed.  It appears that our ultimate plan for this patient to be back to the domiciliary setting, debilitated state remains.  No wound, no restlessness agitation, no nosocomial infections.  Functional mobility balance and gait remains impaired.  No falls here, somewhat steady with assistance.  With assistance he can stand and take few steps.  Required help with the transfer activities.  Continue skilled nursing and rehabilitation under medical care here at the facility.     Goals    None           Electronically Signed By: Oumar Olson MD   7/14/24  9:39 PM

## 2024-07-15 ENCOUNTER — APPOINTMENT (OUTPATIENT)
Dept: ORTHOPEDIC SURGERY | Facility: CLINIC | Age: 84
End: 2024-07-15
Payer: MEDICARE

## 2024-07-15 NOTE — PROGRESS NOTES
Subjective   Patient ID: Yared Aquino is a 84 y.o. male who is acute skilled care being seen and evaluated for multiple medical problems.    Patient is somewhat better, hemoglobin is 11.4, creatinine is 1.16, mobility has improved, ambulation is improved, originally admitted for fracture of pelvis, fracture of pelvis takes time to improve.  Patient has atrial fibrillation with watchman's procedure, no fluid overload, remains on Lasix losartan.  Remains on amlodipine.  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  Appetite has been stable, no pain or discomfort out of ordinary, daily routine has been well-kept and maintained, family member is not at bedside.         Review of Systems   Constitutional:  Negative for activity change, diaphoresis and fatigue.   HENT:  Negative for drooling.    Respiratory:  Negative for apnea, cough and shortness of breath.    Cardiovascular:  Negative for chest pain.   Gastrointestinal:  Negative for abdominal pain, nausea and vomiting.   Musculoskeletal:  Positive for arthralgias and gait problem.   Skin:  Negative for wound.   Neurological:  Positive for weakness.       Objective   /78   Pulse 68     Physical Exam  Constitutional:       Appearance: Normal appearance. He is normal weight. He is not ill-appearing or toxic-appearing.      Comments: Weak appearing   HENT:      Head: Normocephalic and atraumatic.   Eyes:      Conjunctiva/sclera: Conjunctivae normal.   Cardiovascular:      Rate and Rhythm: Normal rate. Rhythm irregular.      Pulses: Normal pulses.   Pulmonary:      Effort: Pulmonary effort is normal.      Breath sounds: Normal breath sounds. No rales.   Abdominal:      Palpations: Abdomen is soft.      Tenderness: There is abdominal tenderness.   Musculoskeletal:         General: Tenderness present.      Cervical back: Neck supple.   Skin:     General: Skin is warm and dry.      Findings: Bruising present.    Neurological:      Mental Status: He is oriented to person, place, and time.   Assessment/Plan   Problem List Items Addressed This Visit             ICD-10-CM    Chronic diastolic CHF (congestive heart failure) (Multi) I50.32    Essential hypertension I10    Presence of Watchman left atrial appendage closure device (Chronic) Z95.818    Permanent atrial fibrillation (Multi) (Chronic) I48.21    Pubic ramus fracture, right, closed, initial encounter (Multi) - Primary S32.591A   Patient is doing better, remains DNR, mentation and alertness is better, pain control is reasonable, chronic atrial fibrillation remains.  Lasix to be continued no fluid overload, BP readings has been reviewed.  It appears that our ultimate plan for this patient to be back to the domiciliary setting, debilitated state remains.  No wound, no restlessness agitation, no nosocomial infections.  Functional mobility balance and gait remains impaired.  No falls here, somewhat steady with assistance.  With assistance he can stand and take few steps.  Required help with the transfer activities.  Continue skilled nursing and rehabilitation under medical care here at the facility.     Goals    None

## 2024-07-29 ENCOUNTER — APPOINTMENT (OUTPATIENT)
Dept: ORTHOPEDIC SURGERY | Facility: CLINIC | Age: 84
End: 2024-07-29
Payer: MEDICARE

## 2024-12-05 ENCOUNTER — LAB REQUISITION (OUTPATIENT)
Dept: LAB | Facility: HOSPITAL | Age: 84
End: 2024-12-05
Payer: MEDICARE

## 2024-12-05 DIAGNOSIS — I48.21 PERMANENT ATRIAL FIBRILLATION (MULTI): ICD-10-CM

## 2024-12-05 DIAGNOSIS — I10 ESSENTIAL (PRIMARY) HYPERTENSION: ICD-10-CM

## 2024-12-05 DIAGNOSIS — I50.32 CHRONIC DIASTOLIC (CONGESTIVE) HEART FAILURE: ICD-10-CM

## 2024-12-05 LAB
ALBUMIN SERPL BCP-MCNC: 3.6 G/DL (ref 3.4–5)
ALP SERPL-CCNC: 109 U/L (ref 33–136)
ALT SERPL W P-5'-P-CCNC: 10 U/L (ref 10–52)
ANION GAP SERPL CALC-SCNC: 11 MMOL/L (ref 10–20)
AST SERPL W P-5'-P-CCNC: 14 U/L (ref 9–39)
BILIRUB SERPL-MCNC: 0.4 MG/DL (ref 0–1.2)
BNP SERPL-MCNC: 320 PG/ML (ref 0–99)
BUN SERPL-MCNC: 36 MG/DL (ref 6–23)
CALCIUM SERPL-MCNC: 8.6 MG/DL (ref 8.6–10.3)
CHLORIDE SERPL-SCNC: 106 MMOL/L (ref 98–107)
CO2 SERPL-SCNC: 23 MMOL/L (ref 21–32)
CREAT SERPL-MCNC: 1.11 MG/DL (ref 0.5–1.3)
EGFRCR SERPLBLD CKD-EPI 2021: 66 ML/MIN/1.73M*2
ERYTHROCYTE [DISTWIDTH] IN BLOOD BY AUTOMATED COUNT: 12.4 % (ref 11.5–14.5)
GLUCOSE SERPL-MCNC: 115 MG/DL (ref 74–99)
HCT VFR BLD AUTO: 38.2 % (ref 41–52)
HGB BLD-MCNC: 12.8 G/DL (ref 13.5–17.5)
MCH RBC QN AUTO: 33.5 PG (ref 26–34)
MCHC RBC AUTO-ENTMCNC: 33.5 G/DL (ref 32–36)
MCV RBC AUTO: 100 FL (ref 80–100)
NRBC BLD-RTO: 0 /100 WBCS (ref 0–0)
PLATELET # BLD AUTO: 177 X10*3/UL (ref 150–450)
POTASSIUM SERPL-SCNC: 4.3 MMOL/L (ref 3.5–5.3)
PROT SERPL-MCNC: 6.1 G/DL (ref 6.4–8.2)
RBC # BLD AUTO: 3.82 X10*6/UL (ref 4.5–5.9)
SODIUM SERPL-SCNC: 136 MMOL/L (ref 136–145)
WBC # BLD AUTO: 6.2 X10*3/UL (ref 4.4–11.3)

## 2024-12-05 PROCEDURE — 80053 COMPREHEN METABOLIC PANEL: CPT

## 2024-12-05 PROCEDURE — 83880 ASSAY OF NATRIURETIC PEPTIDE: CPT

## 2024-12-05 PROCEDURE — 85027 COMPLETE CBC AUTOMATED: CPT
